# Patient Record
Sex: MALE | Race: WHITE | NOT HISPANIC OR LATINO | Employment: OTHER | URBAN - METROPOLITAN AREA
[De-identification: names, ages, dates, MRNs, and addresses within clinical notes are randomized per-mention and may not be internally consistent; named-entity substitution may affect disease eponyms.]

---

## 2018-01-15 ENCOUNTER — ALLSCRIPTS OFFICE VISIT (OUTPATIENT)
Dept: OTHER | Facility: OTHER | Age: 73
End: 2018-01-15

## 2018-01-15 DIAGNOSIS — N40.1 ENLARGED PROSTATE WITH LOWER URINARY TRACT SYMPTOMS (LUTS): ICD-10-CM

## 2018-01-15 LAB
BILIRUB UR QL STRIP: NORMAL
CLARITY UR: NORMAL
COLOR UR: YELLOW
GLUCOSE (HISTORICAL): NORMAL
HGB UR QL STRIP.AUTO: NORMAL
KETONES UR STRIP-MCNC: NORMAL MG/DL
LEUKOCYTE ESTERASE UR QL STRIP: NORMAL
NITRITE UR QL STRIP: NORMAL
PH UR STRIP.AUTO: 5.5 [PH]
PROT UR STRIP-MCNC: NORMAL MG/DL
SP GR UR STRIP.AUTO: 1
UROBILINOGEN UR QL STRIP.AUTO: 0.2

## 2018-01-22 VITALS
SYSTOLIC BLOOD PRESSURE: 128 MMHG | DIASTOLIC BLOOD PRESSURE: 78 MMHG | HEIGHT: 68 IN | BODY MASS INDEX: 28.95 KG/M2 | WEIGHT: 191 LBS

## 2018-05-21 ENCOUNTER — TELEPHONE (OUTPATIENT)
Dept: UROLOGY | Facility: MEDICAL CENTER | Age: 73
End: 2018-05-21

## 2018-05-21 NOTE — TELEPHONE ENCOUNTER
I left return voice mail message for patient to advise that he does need an office visit  Prior to scheduling the procedure  Patient last seen in January 2018

## 2018-06-06 RX ORDER — ACETAMINOPHEN 325 MG/1
650 TABLET ORAL EVERY 6 HOURS PRN
COMMUNITY

## 2018-06-06 RX ORDER — UBIDECARENONE 100 MG
100 CAPSULE ORAL
COMMUNITY
Start: 2013-09-12 | End: 2018-06-18

## 2018-06-06 RX ORDER — IBUPROFEN 200 MG
CAPSULE ORAL
COMMUNITY
Start: 2012-03-16 | End: 2018-06-18

## 2018-06-06 RX ORDER — BUPROPION HYDROCHLORIDE 100 MG/1
100 TABLET ORAL DAILY
COMMUNITY
Start: 2013-09-12

## 2018-06-06 RX ORDER — ERGOCALCIFEROL (VITAMIN D2) 10 MCG
1 TABLET ORAL DAILY
COMMUNITY

## 2018-06-06 RX ORDER — DILTIAZEM HYDROCHLORIDE EXTENDED-RELEASE TABLETS 240 MG/1
240 TABLET, EXTENDED RELEASE ORAL
COMMUNITY
Start: 2012-03-16 | End: 2018-06-06

## 2018-06-06 RX ORDER — FOLIC ACID 1 MG/1
1 TABLET ORAL DAILY
COMMUNITY

## 2018-06-06 RX ORDER — ALBUTEROL SULFATE 90 UG/1
2 AEROSOL, METERED RESPIRATORY (INHALATION) 4 TIMES DAILY
COMMUNITY
Start: 2013-09-12

## 2018-06-06 RX ORDER — TAMSULOSIN HYDROCHLORIDE 0.4 MG/1
0.8 CAPSULE ORAL
COMMUNITY
Start: 2012-03-16 | End: 2018-10-22

## 2018-06-06 RX ORDER — LORATADINE 10 MG/1
10 TABLET ORAL DAILY
COMMUNITY

## 2018-06-06 RX ORDER — ASPIRIN 81 MG/1
TABLET, CHEWABLE ORAL
COMMUNITY
Start: 2012-03-16

## 2018-06-11 ENCOUNTER — OFFICE VISIT (OUTPATIENT)
Dept: UROLOGY | Facility: MEDICAL CENTER | Age: 73
End: 2018-06-11
Payer: MEDICARE

## 2018-06-11 VITALS
WEIGHT: 191 LBS | DIASTOLIC BLOOD PRESSURE: 78 MMHG | SYSTOLIC BLOOD PRESSURE: 124 MMHG | HEIGHT: 68 IN | BODY MASS INDEX: 28.95 KG/M2

## 2018-06-11 DIAGNOSIS — N13.8 BPH WITH OBSTRUCTION/LOWER URINARY TRACT SYMPTOMS: Primary | ICD-10-CM

## 2018-06-11 DIAGNOSIS — N40.1 BPH WITH OBSTRUCTION/LOWER URINARY TRACT SYMPTOMS: Primary | ICD-10-CM

## 2018-06-11 DIAGNOSIS — I10 HYPERTENSION, BENIGN: ICD-10-CM

## 2018-06-11 DIAGNOSIS — J43.8 OTHER EMPHYSEMA (HCC): ICD-10-CM

## 2018-06-11 PROBLEM — H25.093 AGE-RELATED INCIPIENT CATARACT OF BOTH EYES: Status: ACTIVE | Noted: 2018-01-09

## 2018-06-11 PROBLEM — H33.20: Status: ACTIVE | Noted: 2018-01-09

## 2018-06-11 PROBLEM — J40 SINOBRONCHITIS: Status: ACTIVE | Noted: 2018-04-25

## 2018-06-11 PROBLEM — J32.9 SINOBRONCHITIS: Status: ACTIVE | Noted: 2018-04-25

## 2018-06-11 PROBLEM — J44.9 COPD (CHRONIC OBSTRUCTIVE PULMONARY DISEASE) (HCC): Status: ACTIVE | Noted: 2018-01-09

## 2018-06-11 LAB
SL AMB  POCT GLUCOSE, UA: NORMAL
SL AMB LEUKOCYTE ESTERASE,UA: NORMAL
SL AMB POCT BILIRUBIN,UA: NORMAL
SL AMB POCT BLOOD,UA: NORMAL
SL AMB POCT CLARITY,UA: CLEAR
SL AMB POCT COLOR,UA: YELLOW
SL AMB POCT KETONES,UA: NORMAL
SL AMB POCT NITRITE,UA: NORMAL
SL AMB POCT PH,UA: 5.5
SL AMB POCT SPECIFIC GRAVITY,UA: 1
SL AMB POCT URINE PROTEIN: NORMAL
SL AMB POCT UROBILINOGEN: 0.2

## 2018-06-11 PROCEDURE — 99214 OFFICE O/P EST MOD 30 MIN: CPT | Performed by: UROLOGY

## 2018-06-11 PROCEDURE — 81003 URINALYSIS AUTO W/O SCOPE: CPT | Performed by: UROLOGY

## 2018-06-11 RX ORDER — PERPHENAZINE/AMITRIPTYLINE HCL 2 MG-25 MG
1 TABLET ORAL DAILY
COMMUNITY

## 2018-06-11 RX ORDER — GREEN TEA/HOODIA GORDONII 315-12.5MG
1 CAPSULE ORAL DAILY
COMMUNITY
End: 2022-07-13

## 2018-06-11 RX ORDER — ESCITALOPRAM OXALATE 20 MG/1
20 TABLET ORAL
COMMUNITY
End: 2022-07-13

## 2018-06-11 RX ORDER — BUSPIRONE HYDROCHLORIDE 10 MG/1
20 TABLET ORAL 2 TIMES DAILY
COMMUNITY

## 2018-06-11 NOTE — PROGRESS NOTES
Assessment/Plan:    BPH with obstruction/lower urinary tract symptoms  Patient is unhappy with his voiding pattern at this time  His symptoms are interfering with his quality of life  His AUA symptom score is 23  PSA was 0 51 on February 14, 2018  A CT scan done at the [address] [place] [address] in January documents normal upper tracts  The prostate is noted to be enlarged  We again discussed treatment options and he was interested in pursuing photoselective vaporization of the prostate with the GreenLight laser  The procedure was described  Risks, benefits and alternatives were discussed  Consent form was signed  Diagnoses and all orders for this visit:    BPH with obstruction/lower urinary tract symptoms  -     POCT urine dip auto non-scope    Hypertension, benign    Other emphysema ([address] )    Other orders  -     acetaminophen (TYLENOL) 325 mg tablet; Take by mouth  -     albuterol (PROVENTIL HFA,VENTOLIN HFA) 90 mcg/act inhaler; Inhale  -     mometasone (ASMANEX 60 METERED DOSES) 220 MCG/INH inhaler; Inhale  -     aspirin 81 mg chewable tablet; one daily  -     B Complex Vitamins (VITAMIN B COMPLEX PO); Take 2,500 mcg by mouth  -     buPROPion (WELLBUTRIN) 100 mg tablet; Take 100 mg by mouth  -     calcium carbonate (OS-CRISELDA) 1250 (500 Ca) MG tablet; twice daily  -     Cholecalciferol 2000 units CAPS; Take 1 capsule by mouth  -     co-enzyme Q-10 100 mg capsule; Take 100 mg by mouth  -     Multiple Vitamin (DAILY VALUE MULTIVITAMIN) TABS; Take by mouth  -     Discontinue: diltiazem (CARDIZEM LA) 240 MG 24 hr tablet; Take 240 mg by mouth  -     folic acid (FOLVITE) 1 mg tablet; Take by mouth  -     loratadine (CLARITIN) 10 mg tablet; Take by mouth  -     Albuterol Sulfate (PROAIR RESPICLICK) 370 (90 Base) MCG/ACT AEPB; Inhale  -     tamsulosin (FLOMAX) 0 4 mg; Take 0 4 mg by mouth  -     busPIRone (BUSPAR) 10 mg tablet; Take 20 mg by mouth 2 (two) times a day  -     escitalopram (LEXAPRO) 10 mg tablet;  Take 5 mg by mouth daily  -     Flaxseed, Linseed, (FLAX SEED OIL) 1000 MG CAPS; Take by mouth  -     TONALIN SAFFLOWER OIL CLA 1000 MG CAPS; Take by mouth          Subjective:      Patient ID: Mamie Panchal is a 72 y o  male  81-year-old male returns to the office today to discuss further treatment of his voiding symptoms  He is taking 2 Flomax nightly and notes his symptoms continued to get worse  He voids with a slow stream and does not empty his bladder well  He has urgency but no incontinence  He is getting up at least 3 times at night to urinate  There is no gross hematuria, dysuria or symptoms of infection  He feels that his symptoms are interfering with his quality of life  The following portions of the patient's history were reviewed and updated as appropriate: allergies, current medications, past family history, past medical history, past social history, past surgical history and problem list     Review of Systems   Constitutional: Negative for chills, diaphoresis, fatigue and fever  HENT: Negative  Eyes: Negative  Respiratory: Negative  Cardiovascular: Negative  Endocrine: Negative  Musculoskeletal: Negative  Skin: Negative  Allergic/Immunologic: Negative  Neurological: Negative  Hematological: Negative  Psychiatric/Behavioral: Negative  Objective:      /78 (BP Location: Left arm, Patient Position: Sitting)   Ht 5' 8" (1 727 m)   Wt 86 6 kg (191 lb)   BMI 29 04 kg/m²          Physical Exam   Constitutional: He is oriented to person, place, and time  He appears well-developed and well-nourished  HENT:   Head: Normocephalic and atraumatic  Eyes: Conjunctivae are normal    Neck: Neck supple  Cardiovascular: Normal rate  Pulmonary/Chest: Effort normal    Abdominal: He exhibits no distension and no mass  There is no tenderness  There is no rebound and no guarding     No hernia   Genitourinary: Penis normal    Genitourinary Comments: Testes descended and normal to palpation   Neurological: He is alert and oriented to person, place, and time  Skin: Skin is warm and dry  Psychiatric: He has a normal mood and affect  His behavior is normal  Judgment and thought content normal    Nursing note and vitals reviewed

## 2018-06-11 NOTE — LETTER
June 11, 2018     Yolis Higgins DO  [facility] [id number]    Patient: Rachelle Gilbert   YOB: 1945   Date of Visit: 6/11/2018       Dear Dr Glenny Bobo: Thank you for referring James Hunt to me for evaluation  Below are my notes for this consultation  If you have questions, please do not hesitate to call me  I look forward to following your patient along with you  Sincerely,        Meghann Madison MD        CC: No Recipients  Meghann Madison MD  6/11/2018  3:16 PM  Incomplete  Assessment/Plan:    No problem-specific Assessment & Plan notes found for this encounter  Diagnoses and all orders for this visit:    BPH with obstruction/lower urinary tract symptoms  -     POCT urine dip auto non-scope    Other orders  -     acetaminophen (TYLENOL) 325 mg tablet; Take by mouth  -     albuterol (PROVENTIL HFA,VENTOLIN HFA) 90 mcg/act inhaler; Inhale  -     mometasone (ASMANEX 60 METERED DOSES) 220 MCG/INH inhaler; Inhale  -     aspirin 81 mg chewable tablet; one daily  -     B Complex Vitamins (VITAMIN B COMPLEX PO); Take 2,500 mcg by mouth  -     buPROPion (WELLBUTRIN) 100 mg tablet; Take 100 mg by mouth  -     calcium carbonate (OS-CRISELDA) 1250 (500 Ca) MG tablet; twice daily  -     Cholecalciferol 2000 units CAPS; Take 1 capsule by mouth  -     co-enzyme Q-10 100 mg capsule; Take 100 mg by mouth  -     Multiple Vitamin (DAILY VALUE MULTIVITAMIN) TABS; Take by mouth  -     Discontinue: diltiazem (CARDIZEM LA) 240 MG 24 hr tablet; Take 240 mg by mouth  -     folic acid (FOLVITE) 1 mg tablet; Take by mouth  -     loratadine (CLARITIN) 10 mg tablet; Take by mouth  -     Albuterol Sulfate (PROAIR RESPICLICK) 182 (90 Base) MCG/ACT AEPB; Inhale  -     tamsulosin (FLOMAX) 0 4 mg; Take 0 4 mg by mouth  -     busPIRone (BUSPAR) 10 mg tablet; Take 20 mg by mouth 2 (two) times a day  -     escitalopram (LEXAPRO) 10 mg tablet;  Take 5 mg by mouth daily  -     Flaxseed, Linseed, (FLAX SEED OIL) 1000 MG CAPS; Take by mouth  -     TONALIN SAFFLOWER OIL CLA 1000 MG CAPS; Take by mouth          Subjective:      Patient ID: Anna Marie Barraza is a 72 y o  male      HPI    The following portions of the patient's history were reviewed and updated as appropriate: allergies, current medications, past family history, past medical history, past social history, past surgical history and problem list     Review of Systems      Objective:      /78 (BP Location: Left arm, Patient Position: Sitting)   Ht 5' 8" (1 727 m)   Wt 86 6 kg (191 lb)   BMI 29 04 kg/m²           Physical Exam

## 2018-06-11 NOTE — PROGRESS NOTES
IPSS Questionnaire (AUA-7): Over the past month    1)  How often have you had a sensation of not emptying your bladder completely after you finish urinating? 3 - About half the time   2)  How often have you had to urinate again less than two hours after you finished urinating? 4 - More than half the time   3)  How often have you found you stopped and started again several times when you urinated? 4 - More than half the time   4) How difficult have you found it to postpone urination? 4 - More than half the time   5) How often have you had a weak urinary stream?  3 - About half the time   6) How often have you had to push or strain to begin urination? 2 - Less than half the time   7) How many times did you most typically get up to urinate from the time you went to bed until the time you got up in the morning?   3 - 3 times   Total Score:  23

## 2018-06-11 NOTE — ASSESSMENT & PLAN NOTE
Patient is unhappy with his voiding pattern at this time  His symptoms are interfering with his quality of life  His AUA symptom score is 23  PSA was 0 51 on February 14, 2018  A CT scan done at the [place] in January documents normal upper tracts  The prostate is noted to be enlarged  We again discussed treatment options and he was interested in pursuing photoselective vaporization of the prostate with the GreenLight laser  The procedure was described  Risks, benefits and alternatives were discussed  Consent form was signed

## 2018-06-11 NOTE — H&P
IPSS Questionnaire (AUA-7): Over the past month    1)  How often have you had a sensation of not emptying your bladder completely after you finish urinating? 3 - About half the time   2)  How often have you had to urinate again less than two hours after you finished urinating? 4 - More than half the time   3)  How often have you found you stopped and started again several times when you urinated? 4 - More than half the time   4) How difficult have you found it to postpone urination? 4 - More than half the time   5) How often have you had a weak urinary stream?  3 - About half the time   6) How often have you had to push or strain to begin urination? 2 - Less than half the time   7) How many times did you most typically get up to urinate from the time you went to bed until the time you got up in the morning? 3 - 3 times   Total Score:  23         Attestation signed by Idalia Sahu MD at 6/11/2018  3:14 PM:  reviewed    Assessment/Plan:    BPH with obstruction/lower urinary tract symptoms  Patient is unhappy with his voiding pattern at this time  His symptoms are interfering with his quality of life  His AUA symptom score is 23  PSA was 0 51 on February 14, 2018  A CT scan done at the [place] in January documents normal upper tracts  The prostate is noted to be enlarged  We again discussed treatment options and he was interested in pursuing photoselective vaporization of the prostate with the GreenLight laser  The procedure was described  Risks, benefits and alternatives were discussed  Consent form was signed  Diagnoses and all orders for this visit:    BPH with obstruction/lower urinary tract symptoms  -     POCT urine dip auto non-scope    Hypertension, benign    Other emphysema ([address] )    Other orders  -     acetaminophen (TYLENOL) 325 mg tablet; Take by mouth  -     albuterol (PROVENTIL HFA,VENTOLIN HFA) 90 mcg/act inhaler;  Inhale  -     mometasone (ASMANEX 60 METERED DOSES) 220 MCG/INH inhaler; Inhale  -     aspirin 81 mg chewable tablet; one daily  -     B Complex Vitamins (VITAMIN B COMPLEX PO); Take 2,500 mcg by mouth  -     buPROPion (WELLBUTRIN) 100 mg tablet; Take 100 mg by mouth  -     calcium carbonate (OS-CRISELDA) 1250 (500 Ca) MG tablet; twice daily  -     Cholecalciferol 2000 units CAPS; Take 1 capsule by mouth  -     co-enzyme Q-10 100 mg capsule; Take 100 mg by mouth  -     Multiple Vitamin (DAILY VALUE MULTIVITAMIN) TABS; Take by mouth  -     Discontinue: diltiazem (CARDIZEM LA) 240 MG 24 hr tablet; Take 240 mg by mouth  -     folic acid (FOLVITE) 1 mg tablet; Take by mouth  -     loratadine (CLARITIN) 10 mg tablet; Take by mouth  -     Albuterol Sulfate (PROAIR RESPICLICK) 435 (90 Base) MCG/ACT AEPB; Inhale  -     tamsulosin (FLOMAX) 0 4 mg; Take 0 4 mg by mouth  -     busPIRone (BUSPAR) 10 mg tablet; Take 20 mg by mouth 2 (two) times a day  -     escitalopram (LEXAPRO) 10 mg tablet; Take 5 mg by mouth daily  -     Flaxseed, Linseed, (FLAX SEED OIL) 1000 MG CAPS; Take by mouth  -     TONALIN SAFFLOWER OIL CLA 1000 MG CAPS; Take by mouth          Subjective:      Patient ID: Merryl Kawasaki is a 72 y o  male  46-year-old male returns to the office today to discuss further treatment of his voiding symptoms  He is taking 2 Flomax nightly and notes his symptoms continued to get worse  He voids with a slow stream and does not empty his bladder well  He has urgency but no incontinence  He is getting up at least 3 times at night to urinate  There is no gross hematuria, dysuria or symptoms of infection  He feels that his symptoms are interfering with his quality of life          The following portions of the patient's history were reviewed and updated as appropriate: allergies, current medications, past family history, past medical history, past social history, past surgical history and problem list     Review of Systems   Constitutional: Negative for chills, diaphoresis, fatigue and fever    HENT: Negative  Eyes: Negative  Respiratory: Negative  Cardiovascular: Negative  Endocrine: Negative  Musculoskeletal: Negative  Skin: Negative  Allergic/Immunologic: Negative  Neurological: Negative  Hematological: Negative  Psychiatric/Behavioral: Negative  Objective:      /78 (BP Location: Left arm, Patient Position: Sitting)   Ht 5' 8" (1 727 m)   Wt 86 6 kg (191 lb)   BMI 29 04 kg/m²           Physical Exam   Constitutional: He is oriented to person, place, and time  He appears well-developed and well-nourished  HENT:   Head: Normocephalic and atraumatic  Eyes: Conjunctivae are normal    Neck: Neck supple  Cardiovascular: Normal rate  Pulmonary/Chest: Effort normal    Abdominal: He exhibits no distension and no mass  There is no tenderness  There is no rebound and no guarding  No hernia   Genitourinary: Penis normal    Genitourinary Comments: Testes descended and normal to palpation   Neurological: He is alert and oriented to person, place, and time  Skin: Skin is warm and dry  Psychiatric: He has a normal mood and affect  His behavior is normal  Judgment and thought content normal    Nursing note and vitals reviewed

## 2018-06-11 NOTE — PATIENT INSTRUCTIONS
Laser Prostatectomy   WHAT YOU NEED TO KNOW:   Laser prostatectomy is a surgery that uses light beams to destroy part of the prostate gland  This can help reduce urinary problems caused by an enlarged prostate  HOW TO PREPARE:   The week before your surgery:   · Write down the correct date, time, and location of your surgery  · Arrange a ride home  Ask a family member or friend to drive you home after your surgery or procedure  Do not drive yourself home  · Ask your caregiver if you need to stop using aspirin or any other prescribed or over-the-counter medicine before your procedure or surgery  · Bring your medicine bottles or a list of your medicines when you see your caregiver  Tell your caregiver if you are allergic to any medicine  Tell your caregiver if you use any herbs, food supplements, or over-the-counter medicine  · You may need blood or urine tests before your surgery  You may also need x-rays, an EKG, ultrasound, cytoscopy, or prostate biopsy  Talk to your healthcare provider about these or other tests you may need  Write down the date, time, and location for each test   The night before your surgery:   · Ask caregivers about directions for eating and drinking  · You may be given medicine or an enema that will empty your bowel  The day of your surgery:   · Ask your caregiver before you take any medicine on the day of your surgery  Bring a list of all the medicines you take, or your pill bottles, with you to the hospital  Caregivers will check that your medicines will not interact poorly with the medicine you need for surgery  · You or a close family member will be asked to sign a legal document called a consent form  It gives caregivers permission to do the procedure or surgery  It also explains the problems that may happen, and your choices  Make sure all your questions are answered before you sign this form  · Caregivers may insert an intravenous tube (IV) into your vein  A vein in the arm is usually chosen  Through the IV tube, you may be given liquids and medicine  · An anesthesiologist will talk to you before your surgery  You may need medicine to keep you asleep or numb an area of your body during surgery  Tell caregivers if you or anyone in your family has had a problem with anesthesia in the past   WHAT WILL HAPPEN:   What will happen: Your surgeon will insert a scope into your penis through your urethra  He will use the scope to find the narrowed part of your urethra that the prostate blocks  He will make an incision in the urethra to reach the prostate  Your surgeon will use a laser to destroy excess prostate tissue  He may remove a part of your prostate and pass it into your bladder  Once the piece of your prostate is inside your bladder, it will be broken into tiny pieces  These pieces of prostate will be removed or left in your bladder to be passed out in your urine  Your surgeon may also use the laser to seal blood vessels and stop any bleeding  He will place a urinary catheter to help drain your urine  After your surgery: You will be taken to a room to rest until you are fully awake  Healthcare providers will monitor you closely for any problems  Do not get out of bed until your healthcare provider says it is okay  When your healthcare provider sees that you are okay, you will be able to go home or be taken to your hospital room  CONTACT YOUR HEALTHCARE PROVIDER IF:   · You cannot make it to your surgery  · You have a fever  · You have questions or concerns about your surgery  SEEK CARE IMMEDIATELY IF:   · You cannot urinate, or if you have a catheter, no urine is filling the bag  · You have lower abdominal pain or back pain that does not go away  · Your symptoms get worse or come back  · Your urine is red, cloudy, and foul smelling  RISKS:   · The laser may irritate or damage the tissue in and around your prostate   Nerves or blood vessels may be damaged, which may lead to problems urinating or having an erection  Your surgeon may need to make a larger incision than expected during surgery  Even with surgery, your prostate may become enlarged again and you may have problems urinating  · Without treatment, your prostate may continue to grow  You may not be able to pass urine  You may get infections that may damage your urinary tract, especially your kidneys  These may lead to more serious and life-threatening problems, such as heart, liver, or brain damage  CARE AGREEMENT:   You have the right to help plan your care  Learn about your health condition and how it may be treated  Discuss treatment options with your caregivers to decide what care you want to receive  You always have the right to refuse treatment  © 2017 [facility] Information is for End User's use only and may not be sold, redistributed or otherwise used for commercial purposes  All illustrations and images included in CareNotes® are the copyrighted property of A D A M [employer] Inc  or Kristian Davila  The above information is an  only  It is not intended as medical advice for individual conditions or treatments  Talk to your doctor, nurse or pharmacist before following any medical regimen to see if it is safe and effective for you

## 2018-06-12 PROBLEM — N40.1 ENLARGED PROSTATE WITH URINARY OBSTRUCTION: Status: ACTIVE | Noted: 2018-06-12

## 2018-06-12 PROBLEM — N13.8 ENLARGED PROSTATE WITH URINARY OBSTRUCTION: Status: ACTIVE | Noted: 2018-06-12

## 2018-06-17 ENCOUNTER — ANESTHESIA EVENT (OUTPATIENT)
Dept: PERIOP | Facility: HOSPITAL | Age: 73
End: 2018-06-17
Payer: MEDICARE

## 2018-06-17 RX ORDER — SODIUM CHLORIDE 9 MG/ML
125 INJECTION, SOLUTION INTRAVENOUS CONTINUOUS
Status: CANCELLED | OUTPATIENT
Start: 2018-06-28 | End: 2019-06-28

## 2018-06-18 ENCOUNTER — APPOINTMENT (OUTPATIENT)
Dept: PREADMISSION TESTING | Facility: HOSPITAL | Age: 73
End: 2018-06-18
Payer: MEDICARE

## 2018-06-18 ENCOUNTER — APPOINTMENT (OUTPATIENT)
Dept: LAB | Facility: HOSPITAL | Age: 73
End: 2018-06-18
Attending: UROLOGY
Payer: MEDICARE

## 2018-06-18 ENCOUNTER — HOSPITAL ENCOUNTER (OUTPATIENT)
Dept: RADIOLOGY | Facility: HOSPITAL | Age: 73
Discharge: HOME | End: 2018-06-18
Attending: UROLOGY
Payer: MEDICARE

## 2018-06-18 ENCOUNTER — HOSPITAL ENCOUNTER (OUTPATIENT)
Dept: NON INVASIVE DIAGNOSTICS | Facility: HOSPITAL | Age: 73
Discharge: HOME | End: 2018-06-18
Attending: UROLOGY
Payer: MEDICARE

## 2018-06-18 DIAGNOSIS — R39.89 SUSPECTED UTI: ICD-10-CM

## 2018-06-18 DIAGNOSIS — N40.1 ENLARGED PROSTATE WITH LOWER URINARY TRACT SYMPTOMS (LUTS): ICD-10-CM

## 2018-06-18 DIAGNOSIS — Z01.812 PRE-OPERATIVE LABORATORY EXAMINATION: ICD-10-CM

## 2018-06-18 DIAGNOSIS — N13.8 ENLARGED PROSTATE WITH URINARY OBSTRUCTION: ICD-10-CM

## 2018-06-18 DIAGNOSIS — N40.1 ENLARGED PROSTATE WITH URINARY OBSTRUCTION: ICD-10-CM

## 2018-06-18 DIAGNOSIS — Z79.01 LONG TERM (CURRENT) USE OF ANTICOAGULANTS: ICD-10-CM

## 2018-06-18 DIAGNOSIS — Z01.810 PRE-OPERATIVE CARDIOVASCULAR EXAMINATION: ICD-10-CM

## 2018-06-18 LAB
ALBUMIN SERPL BCP-MCNC: 4 G/DL (ref 3.5–5)
ALP SERPL-CCNC: 72 U/L (ref 46–116)
ALT SERPL W P-5'-P-CCNC: 33 U/L (ref 12–78)
ANION GAP SERPL CALCULATED.3IONS-SCNC: 10 MMOL/L (ref 4–13)
APTT PPP: 28 SECONDS (ref 24–36)
AST SERPL W P-5'-P-CCNC: 43 U/L (ref 5–45)
ATRIAL RATE: 54 BPM
BASOPHILS # BLD AUTO: 0.06 THOUSANDS/ΜL (ref 0–0.1)
BASOPHILS NFR BLD AUTO: 1 % (ref 0–1)
BILIRUB SERPL-MCNC: 0.54 MG/DL (ref 0.2–1)
BUN SERPL-MCNC: 24 MG/DL (ref 5–25)
CALCIUM SERPL-MCNC: 9.2 MG/DL (ref 8.3–10.1)
CHLORIDE SERPL-SCNC: 103 MMOL/L (ref 100–108)
CO2 SERPL-SCNC: 27 MMOL/L (ref 21–32)
CREAT SERPL-MCNC: 1.15 MG/DL (ref 0.6–1.3)
EOSINOPHIL # BLD AUTO: 0.16 THOUSAND/ΜL (ref 0–0.61)
EOSINOPHIL NFR BLD AUTO: 2 % (ref 0–6)
ERYTHROCYTE [DISTWIDTH] IN BLOOD BY AUTOMATED COUNT: 13.6 % (ref 11.6–15.1)
GFR SERPL CREATININE-BSD FRML MDRD: 64 ML/MIN/1.73SQ M
GLUCOSE SERPL-MCNC: 103 MG/DL (ref 65–140)
HCT VFR BLD AUTO: 41.9 % (ref 36.5–49.3)
HGB BLD-MCNC: 14.4 G/DL (ref 12–17)
INR PPP: 1.06 (ref 0.86–1.17)
LYMPHOCYTES # BLD AUTO: 2.35 THOUSANDS/ΜL (ref 0.6–4.47)
LYMPHOCYTES NFR BLD AUTO: 35 % (ref 14–44)
MCH RBC QN AUTO: 34.1 PG (ref 26.8–34.3)
MCHC RBC AUTO-ENTMCNC: 34.4 G/DL (ref 31.4–37.4)
MCV RBC AUTO: 99 FL (ref 82–98)
MONOCYTES # BLD AUTO: 0.8 THOUSAND/ΜL (ref 0.17–1.22)
MONOCYTES NFR BLD AUTO: 12 % (ref 4–12)
NEUTROPHILS # BLD AUTO: 3.38 THOUSANDS/ΜL (ref 1.85–7.62)
NEUTS SEG NFR BLD AUTO: 50 % (ref 43–75)
NRBC BLD AUTO-RTO: 0 /100 WBCS
P AXIS: 13 DEGREES
PLATELET # BLD AUTO: 220 THOUSANDS/UL (ref 149–390)
PMV BLD AUTO: 10.3 FL (ref 8.9–12.7)
POTASSIUM SERPL-SCNC: 4.2 MMOL/L (ref 3.5–5.3)
PR INTERVAL: 186 MS
PROT SERPL-MCNC: 8 G/DL (ref 6.4–8.2)
PROTHROMBIN TIME: 13.9 SECONDS (ref 11.8–14.2)
PSA SERPL-MCNC: 0.4 NG/ML (ref 0–4)
QRS AXIS: 9 DEGREES
QRSD INTERVAL: 92 MS
QT INTERVAL: 442 MS
QTC INTERVAL: 419 MS
RBC # BLD AUTO: 4.22 MILLION/UL (ref 3.88–5.62)
SODIUM SERPL-SCNC: 140 MMOL/L (ref 136–145)
T WAVE AXIS: 34 DEGREES
VENTRICULAR RATE: 54 BPM
WBC # BLD AUTO: 6.75 THOUSAND/UL (ref 4.31–10.16)

## 2018-06-18 PROCEDURE — 93010 ELECTROCARDIOGRAM REPORT: CPT | Performed by: INTERNAL MEDICINE

## 2018-06-18 PROCEDURE — 85025 COMPLETE CBC W/AUTO DIFF WBC: CPT

## 2018-06-18 PROCEDURE — 36415 COLL VENOUS BLD VENIPUNCTURE: CPT

## 2018-06-18 PROCEDURE — 93005 ELECTROCARDIOGRAM TRACING: CPT | Performed by: UROLOGY

## 2018-06-18 PROCEDURE — 80053 COMPREHEN METABOLIC PANEL: CPT

## 2018-06-18 PROCEDURE — 84153 ASSAY OF PSA TOTAL: CPT

## 2018-06-18 PROCEDURE — 71046 X-RAY EXAM CHEST 2 VIEWS: CPT

## 2018-06-18 PROCEDURE — 85730 THROMBOPLASTIN TIME PARTIAL: CPT

## 2018-06-18 PROCEDURE — 85610 PROTHROMBIN TIME: CPT

## 2018-06-18 PROCEDURE — 87086 URINE CULTURE/COLONY COUNT: CPT

## 2018-06-18 RX ORDER — OMEPRAZOLE 20 MG/1
20 CAPSULE, DELAYED RELEASE ORAL DAILY
COMMUNITY

## 2018-06-18 RX ORDER — SILDENAFIL 100 MG/1
100 TABLET, FILM COATED ORAL DAILY PRN
COMMUNITY
End: 2022-07-13

## 2018-06-18 NOTE — PRE-PROCEDURE INSTRUCTIONS
Pre-Surgery Instructions:   Medication Instructions    acetaminophen (TYLENOL) 325 mg tablet Patient was instructed by Physician and understands   albuterol (PROVENTIL HFA,VENTOLIN HFA) 90 mcg/act inhaler Patient was instructed by Physician and understands   aspirin 81 mg chewable tablet Patient was instructed by Physician and understands   buPROPion (WELLBUTRIN) 100 mg tablet Patient was instructed by Physician and understands   busPIRone (BUSPAR) 10 mg tablet Patient was instructed by Physician and understands   Capsicum, Cayenne, (CAYENNE PO) Patient was instructed by Physician and understands   Cholecalciferol 5000 units capsule Patient was instructed by Physician and understands   escitalopram (LEXAPRO) 20 mg tablet Patient was instructed by Physician and understands   Flaxseed, Linseed, (FLAX SEED OIL) 1300 MG CAPS Patient was instructed by Physician and understands   folic acid (FOLVITE) 1 mg tablet Patient was instructed by Physician and understands   GUAIFENESIN PO Patient was instructed by Physician and understands   loratadine (CLARITIN) 10 mg tablet Patient was instructed by Physician and understands   mometasone (ASMANEX 60 METERED DOSES) 220 MCG/INH inhaler Patient was instructed by Physician and understands   Multiple Vitamin (DAILY VALUE MULTIVITAMIN) TABS Patient was instructed by Physician and understands   omeprazole (PriLOSEC) 20 mg delayed release capsule Patient was instructed by Physician and understands   sildenafil (VIAGRA) 100 mg tablet Patient was instructed by Physician and understands   tamsulosin (FLOMAX) 0 4 mg Patient was instructed by Physician and understands   TONALIN SAFFLOWER OIL CLA 1000 MG CAPS Patient was instructed by Physician and understands   WHEY PROTEIN PO Patient was instructed by Physician and understands      Patient was seen by Dr Kelsi Rizzo and was instructed to take Wellbutrin and Buspar am of surgery with a sip of water  Patient was instructed to use inhaler am of surgery with a sip of water  Patient was instructed to avoid NSAID, Aspirin, Vitamins, and supplements 7 days prior to surgery  [facility] pre-op instructions reviewed  Pre-op bathing reviewed with patient

## 2018-06-18 NOTE — ANESTHESIA PREPROCEDURE EVALUATION
Review of Systems/Medical History  Patient summary reviewed  Chart reviewed  No history of anesthetic complications     Cardiovascular  Hyperlipidemia, Hypertension ,    Pulmonary  COPD ,        GI/Hepatic    GERD well controlled,   Comment: TICS     Prostatic disorder, benign prostatic hyperplasia       Endo/Other  Diabetes ,      GYN       Hematology  Negative hematology ROS      Musculoskeletal    Arthritis     Neurology   Psychology   Psychiatric history,     Comment: PTSD         Physical Exam    Airway    Mallampati score: II  TM Distance: >3 FB  Neck ROM: full     Dental   Comment: Full upper  Partial lower, upper dentures and lower dentures,     Cardiovascular  Rhythm: regular, Rate: normal,     Pulmonary      Other Findings        Anesthesia Plan  ASA Score- 3     Anesthesia Type- general with ASA Monitors  Additional Monitors:   Airway Plan:         Plan Factors-Patient not instructed to abstain from smoking on day of procedure  Patient did not smoke on day of surgery  Induction- intravenous  Postoperative Plan-     Informed Consent- Anesthetic plan and risks discussed with patient

## 2018-06-19 LAB — BACTERIA UR CULT: NORMAL

## 2018-06-28 ENCOUNTER — ANESTHESIA (OUTPATIENT)
Dept: PERIOP | Facility: HOSPITAL | Age: 73
End: 2018-06-28
Payer: MEDICARE

## 2018-06-28 ENCOUNTER — HOSPITAL ENCOUNTER (OUTPATIENT)
Facility: HOSPITAL | Age: 73
Setting detail: OUTPATIENT SURGERY
Discharge: HOME | End: 2018-06-28
Attending: UROLOGY | Admitting: UROLOGY
Payer: MEDICARE

## 2018-06-28 VITALS
SYSTOLIC BLOOD PRESSURE: 144 MMHG | RESPIRATION RATE: 17 BRPM | OXYGEN SATURATION: 97 % | BODY MASS INDEX: 28.34 KG/M2 | DIASTOLIC BLOOD PRESSURE: 66 MMHG | WEIGHT: 187 LBS | HEIGHT: 68 IN | HEART RATE: 50 BPM | TEMPERATURE: 99 F

## 2018-06-28 DIAGNOSIS — N40.1 BPH WITH OBSTRUCTION/LOWER URINARY TRACT SYMPTOMS: Primary | ICD-10-CM

## 2018-06-28 DIAGNOSIS — N13.8 BPH WITH OBSTRUCTION/LOWER URINARY TRACT SYMPTOMS: Primary | ICD-10-CM

## 2018-06-28 LAB — GLUCOSE SERPL-MCNC: 98 MG/DL (ref 65–140)

## 2018-06-28 PROCEDURE — 82948 REAGENT STRIP/BLOOD GLUCOSE: CPT

## 2018-06-28 PROCEDURE — 52648 LASER SURGERY OF PROSTATE: CPT | Performed by: UROLOGY

## 2018-06-28 RX ORDER — MAGNESIUM HYDROXIDE 1200 MG/15ML
LIQUID ORAL AS NEEDED
Status: DISCONTINUED | OUTPATIENT
Start: 2018-06-28 | End: 2018-06-28 | Stop reason: HOSPADM

## 2018-06-28 RX ORDER — PHENAZOPYRIDINE HYDROCHLORIDE 200 MG/1
200 TABLET, FILM COATED ORAL
Status: DISCONTINUED | OUTPATIENT
Start: 2018-06-28 | End: 2018-06-28 | Stop reason: HOSPADM

## 2018-06-28 RX ORDER — ONDANSETRON 2 MG/ML
INJECTION INTRAMUSCULAR; INTRAVENOUS AS NEEDED
Status: DISCONTINUED | OUTPATIENT
Start: 2018-06-28 | End: 2018-06-28 | Stop reason: SURG

## 2018-06-28 RX ORDER — SODIUM CHLORIDE 9 MG/ML
125 INJECTION, SOLUTION INTRAVENOUS CONTINUOUS
Status: DISCONTINUED | OUTPATIENT
Start: 2018-06-28 | End: 2018-06-28 | Stop reason: HOSPADM

## 2018-06-28 RX ORDER — FENTANYL CITRATE/PF 50 MCG/ML
25 SYRINGE (ML) INJECTION
Status: COMPLETED | OUTPATIENT
Start: 2018-06-28 | End: 2018-06-28

## 2018-06-28 RX ORDER — GLYCOPYRROLATE 0.2 MG/ML
INJECTION INTRAMUSCULAR; INTRAVENOUS AS NEEDED
Status: DISCONTINUED | OUTPATIENT
Start: 2018-06-28 | End: 2018-06-28 | Stop reason: SURG

## 2018-06-28 RX ORDER — ONDANSETRON 2 MG/ML
4 INJECTION INTRAMUSCULAR; INTRAVENOUS ONCE
Status: DISCONTINUED | OUTPATIENT
Start: 2018-06-28 | End: 2018-06-28 | Stop reason: HOSPADM

## 2018-06-28 RX ORDER — LIDOCAINE HYDROCHLORIDE 10 MG/ML
INJECTION, SOLUTION INFILTRATION; PERINEURAL AS NEEDED
Status: DISCONTINUED | OUTPATIENT
Start: 2018-06-28 | End: 2018-06-28 | Stop reason: SURG

## 2018-06-28 RX ORDER — PROPOFOL 10 MG/ML
INJECTION, EMULSION INTRAVENOUS AS NEEDED
Status: DISCONTINUED | OUTPATIENT
Start: 2018-06-28 | End: 2018-06-28 | Stop reason: SURG

## 2018-06-28 RX ORDER — ROCURONIUM BROMIDE 10 MG/ML
INJECTION, SOLUTION INTRAVENOUS AS NEEDED
Status: DISCONTINUED | OUTPATIENT
Start: 2018-06-28 | End: 2018-06-28 | Stop reason: SURG

## 2018-06-28 RX ORDER — MORPHINE SULFATE 2 MG/ML
2 INJECTION, SOLUTION INTRAMUSCULAR; INTRAVENOUS
Status: DISCONTINUED | OUTPATIENT
Start: 2018-06-28 | End: 2018-06-28 | Stop reason: HOSPADM

## 2018-06-28 RX ORDER — OXYCODONE HYDROCHLORIDE 5 MG/1
TABLET ORAL
Qty: 20 TABLET | Refills: 0 | Status: SHIPPED | OUTPATIENT
Start: 2018-06-28 | End: 2018-10-22 | Stop reason: ALTCHOICE

## 2018-06-28 RX ORDER — ONDANSETRON 2 MG/ML
4 INJECTION INTRAMUSCULAR; INTRAVENOUS EVERY 6 HOURS PRN
Status: DISCONTINUED | OUTPATIENT
Start: 2018-06-28 | End: 2018-06-28 | Stop reason: HOSPADM

## 2018-06-28 RX ORDER — ATROPA BELLADONNA AND OPIUM 16.2; 6 MG/1; MG/1
1 SUPPOSITORY RECTAL EVERY 6 HOURS PRN
Status: DISCONTINUED | OUTPATIENT
Start: 2018-06-28 | End: 2018-06-28 | Stop reason: HOSPADM

## 2018-06-28 RX ORDER — OXYCODONE HYDROCHLORIDE 5 MG/1
5 TABLET ORAL EVERY 4 HOURS PRN
Status: DISCONTINUED | OUTPATIENT
Start: 2018-06-28 | End: 2018-06-28 | Stop reason: HOSPADM

## 2018-06-28 RX ORDER — ACETAMINOPHEN 325 MG/1
650 TABLET ORAL EVERY 6 HOURS PRN
Status: DISCONTINUED | OUTPATIENT
Start: 2018-06-28 | End: 2018-06-28 | Stop reason: HOSPADM

## 2018-06-28 RX ADMIN — HYDROMORPHONE HYDROCHLORIDE 1 MG: 1 INJECTION, SOLUTION INTRAMUSCULAR; INTRAVENOUS; SUBCUTANEOUS at 13:16

## 2018-06-28 RX ADMIN — ROCURONIUM BROMIDE 50 MG: 10 INJECTION INTRAVENOUS at 12:46

## 2018-06-28 RX ADMIN — DEXAMETHASONE SODIUM PHOSPHATE 4 MG: 10 INJECTION INTRAMUSCULAR; INTRAVENOUS at 13:11

## 2018-06-28 RX ADMIN — PROPOFOL 200 MG: 10 INJECTION, EMULSION INTRAVENOUS at 12:46

## 2018-06-28 RX ADMIN — ONDANSETRON HYDROCHLORIDE 4 MG: 2 INJECTION, SOLUTION INTRAVENOUS at 13:11

## 2018-06-28 RX ADMIN — FENTANYL CITRATE 25 MCG: 50 INJECTION, SOLUTION INTRAMUSCULAR; INTRAVENOUS at 14:18

## 2018-06-28 RX ADMIN — HYDROMORPHONE HYDROCHLORIDE 1 MG: 1 INJECTION, SOLUTION INTRAMUSCULAR; INTRAVENOUS; SUBCUTANEOUS at 13:08

## 2018-06-28 RX ADMIN — LIDOCAINE HYDROCHLORIDE 60 MG: 10 INJECTION, SOLUTION INFILTRATION; PERINEURAL at 12:46

## 2018-06-28 RX ADMIN — FENTANYL CITRATE 25 MCG: 50 INJECTION, SOLUTION INTRAMUSCULAR; INTRAVENOUS at 14:38

## 2018-06-28 RX ADMIN — SODIUM CHLORIDE 125 ML/HR: 0.9 INJECTION, SOLUTION INTRAVENOUS at 09:22

## 2018-06-28 RX ADMIN — FENTANYL CITRATE 25 MCG: 50 INJECTION, SOLUTION INTRAMUSCULAR; INTRAVENOUS at 14:48

## 2018-06-28 RX ADMIN — PHENAZOPYRIDINE HYDROCHLORIDE 200 MG: 200 TABLET ORAL at 15:23

## 2018-06-28 RX ADMIN — SODIUM CHLORIDE: 0.9 INJECTION, SOLUTION INTRAVENOUS at 13:36

## 2018-06-28 RX ADMIN — GLYCOPYRROLATE 0.6 MG: 0.2 INJECTION, SOLUTION INTRAMUSCULAR; INTRAVENOUS at 13:43

## 2018-06-28 RX ADMIN — SODIUM CHLORIDE: 0.9 INJECTION, SOLUTION INTRAVENOUS at 12:31

## 2018-06-28 RX ADMIN — NEOSTIGMINE METHYLSULFATE 4 MG: 1 INJECTION, SOLUTION INTRAMUSCULAR; INTRAVENOUS; SUBCUTANEOUS at 13:43

## 2018-06-28 RX ADMIN — FENTANYL CITRATE 25 MCG: 50 INJECTION, SOLUTION INTRAMUSCULAR; INTRAVENOUS at 14:28

## 2018-06-28 NOTE — DISCHARGE SUMMARY
DISCHARGE SUMMARY     Patient Name: Addie Holbrook    Patient MRN: [id number]    Admitting Provider: Fer Cline MD    Discharging Provider: Fer Cline MD    Primary Care Physician at Discharge: Brown Rosen, [address] [facility]     Admission Date: 6/28/2018     Discharge Date: 6/28/2018    Admission Diagnosis   Enlarged prostate with urinary obstruction [N40 1, N13 8]    Discharge Diagnoses  Principal Problem:    Enlarged prostate with urinary obstruction      Medications  Current Discharge Medication List      START taking these medications    Details   oxyCODONE (ROXICODONE) 5 mg immediate release tablet Take 1-2 tablets every 6 hours prn  Qty: 20 tablet, Refills: 0    Associated Diagnoses: BPH with obstruction/lower urinary tract symptoms            Current Discharge Medication List      CONTINUE these medications which have NOT CHANGED    Details   acetaminophen (TYLENOL) 325 mg tablet Take 650 mg by mouth every 6 (six) hours as needed        albuterol (PROVENTIL HFA,VENTOLIN HFA) 90 mcg/act inhaler Inhale 2 puffs 4 (four) times a day        aspirin 81 mg chewable tablet one daily      buPROPion (WELLBUTRIN) 100 mg tablet Take 100 mg by mouth daily        busPIRone (BUSPAR) 10 mg tablet Take 20 mg by mouth 2 (two) times a day      Capsicum, Cayenne, (CAYENNE PO) Take by mouth daily 4 drops daily with honey      Cholecalciferol 5000 units capsule Take 1 capsule by mouth daily        escitalopram (LEXAPRO) 20 mg tablet Take 20 mg by mouth daily at bedtime        Flaxseed, Linseed, (FLAX SEED OIL) 1300 MG CAPS Take 1 capsule by mouth daily        folic acid (FOLVITE) 1 mg tablet Take 1 mg by mouth daily        GUAIFENESIN PO Take 1 tablet by mouth 3 (three) times a day      loratadine (CLARITIN) 10 mg tablet Take 10 mg by mouth daily        mometasone (ASMANEX 60 METERED DOSES) 220 MCG/INH inhaler Inhale 2 puffs every evening        Multiple Vitamin (DAILY VALUE MULTIVITAMIN) TABS Take 1 tablet by mouth daily        omeprazole (PriLOSEC) 20 mg delayed release capsule Take 20 mg by mouth daily      sildenafil (VIAGRA) 100 mg tablet Take 100 mg by mouth daily as needed for erectile dysfunction      tamsulosin (FLOMAX) 0 4 mg Take 0 8 mg by mouth daily with dinner        TONALIN SAFFLOWER OIL CLA 1000 MG CAPS Take 1 capsule by mouth daily        WHEY PROTEIN PO Take 1 tablet by mouth 3 (three) times a day             Allergies  Allergies   Allergen Reactions    Naproxen Rash       Outpatient Follow-Up  Dariel Gonsales MD  [facility] [address]  [phone]    Follow up  Piedra out Monday morning in the office with the nurse  Please make a follow-up appointment to see me in 4-6 weeks  Keep any pre arranged appointments  ? Discharge Disposition  Home    Operative Procedures Performed  Procedure(s):  CYSTOSCOPY, PHOTO SELECTIVE VAPORIZATION OF THE PROSTATE (GREENLIGHT LASER)  ? Physical Exam at Discharge  Condition of Patient on Discharge: stable  ?   Dariel Gonsales MD

## 2018-06-28 NOTE — DISCHARGE INSTRUCTIONS
Laser Prostatectomy   WHAT YOU NEED TO KNOW:   Laser prostatectomy is a surgery that uses light beams to destroy part of the prostate gland  This can help reduce urinary problems caused by an enlarged prostate  DISCHARGE INSTRUCTIONS:   Medicines:   · Medicines  can help decrease pain or swelling  You may also need medicine to prevent or treat a bacterial infection  · Take your medicine as directed  Contact your healthcare provider if you think your medicine is not helping or if you have side effects  Tell him if you are allergic to any medicine  Keep a list of the medicines, vitamins, and herbs you take  Include the amounts, and when and why you take them  Bring the list or the pill bottles to follow-up visits  Carry your medicine list with you in case of an emergency  Follow up with your healthcare provider or surgeon as directed:  Write down your questions so you remember to ask them during your visits  Bladder care:   · Empty your bladder on a regular basis  Try to urinate every 3 hours while you are awake  Do not let your bladder become too full  Urinate as soon as you feel the need  Do not drink liquids before you go to bed  Urinate right before you go to bed  · Follow instructions for catheterization  You may need to catheterize yourself if you cannot urinate on your own  Ask for more information on self-catheterization  Self-care:   · Rest as needed  Slowly begin doing more each day  Return to your daily activities as directed  · Prevent constipation  Eat foods that are high in fiber, and drink more liquids  High-fiber foods, such as fruits, vegetables, and whole grains, will help soften your bowel movements  Regular exercise and extra liquids also help prevent constipation  · Ask your healthcare provider when it is safe to have sex  Do not get sexually aroused without ejaculating because your urethra may get blocked  · Do not smoke    If you smoke, it is never too late to quit  Smoking increases the time it takes to heal  Ask your healthcare provider for information about how to stop smoking if you need help  Contact your healthcare provider or surgeon if:   · You have a fever  · Your symptoms get worse  · You have blood in your urine  · You have chills, a cough, or feel weak and achy  · You are dizzy, have nausea, or are vomiting  · You have questions or concerns about your condition or care  Seek care immediately or call 911 if:   · You cannot urinate, or if you have a catheter, no urine is filling the bag  · Your catheter comes out of your urethra  · You have lower abdominal or back pain that does not go away  · You have redness, pain, blood, or drainage where the catheter enters your penis  · Your urine is red, cloudy, and foul smelling  © 2017 [employer]  Information is for End User's use only and may not be sold, redistributed or otherwise used for commercial purposes  All illustrations and images included in CareNotes® are the copyrighted property of A D A M , Inc  or [address]  The above information is an  only  It is not intended as medical advice for individual conditions or treatments  Talk to your doctor, nurse or pharmacist before following any medical regimen to see if it is safe and effective for you  Piedra Catheter Placement and Care   WHAT YOU NEED TO KNOW:   A Piedra catheter is a sterile tube that is inserted into your bladder to drain urine  It is also called an indwelling urinary catheter  The tip of the catheter has a small balloon filled with solution that holds the catheter inside your bladder  DISCHARGE INSTRUCTIONS:   Seek care immediately if:   · Your catheter comes out  · You suddenly have material that looks like sand in the tubing or drainage bag  · No urine is draining into the bag and you have checked the system      · You have pain in your hip, back, pelvis, or lower abdomen  · You are confused or cannot think clearly  Contact your healthcare provider if:   · You have a fever  · You have bladder spasms for more than 1 day after the catheter is placed  · You see blood in the tubing or drainage bag  · You have a rash or itching where the catheter tube is secured to your skin  · Urine leaks from or around the catheter, tubing, or drainage bag  · The closed drainage system has accidently come open or apart  · You see a layer of crystals inside the tubing  · You have questions or concerns about your condition or care  Care for your Piedra catheter:   · Clean your genital area 2 times every day  Clean your catheter and the area around where it was inserted  Use soap and water  Clean your anal opening and catheter area after every bowel movement  · Secure the catheter tube  so you do not pull or move the catheter  This helps prevent pain and bladder spasms  Healthcare providers will show you how to use medical tape or a strap to secure the catheter tube to your body  · Keep a closed drainage system  Your Piedra catheter should always be attached to the drainage bag to form a closed system  Do not disconnect any part of the closed system unless you need to change the bag  Care for your drainage bag:   · Ask if a leg bag is right for you  A leg bag can be worn under your clothes  Ask your healthcare provider for more information about a leg bag  · Keep the drainage bag below the level of your waist   This helps stop urine from moving back up the tubing and into your bladder  Do not loop or kink the tubing  This can cause urine to back up and collect in your bladder  Do not let the drainage bag touch or lie on the floor  · Empty the drainage bag when needed  The weight of a full drainage bag can be painful  Empty the drainage bag every 3 to 6 hours or when it is ? full       · Clean and change the drainage bag as directed  Ask your healthcare provider how often you should change the drainage bag and what cleaning solution to use  Wear disposable gloves when you change the bag  Do not allow the end of the catheter or tubing to touch anything  Clean the ends with an alcohol pad before you reconnect them  What to do if problems develop:   · No urine is draining into the bag:      ¨ Check for kinks in the tubing and straighten them out  ¨ Check the tape or strap used to secure the catheter tube to your skin  Make sure it is not blocking the tube  ¨ Make sure you are not sitting or lying on the tubing  ¨ Make sure the urine bag is hanging below the level of your waist     · Urine leaks from or around the catheter, tubing, or drainage bag:  Check if the closed drainage system has accidently come open or apart  Clean the catheter and tubing ends with a new alcohol pad and reconnect them  Prevent an infection:   · Wash your hands often  Wash before and after you touch your catheter, tubing, or drainage bag  Use soap and water  Wear clean disposable gloves when you care for your catheter or disconnect the drainage bag  Wash your hands before you prepare or eat food  · Drink liquids as directed  Ask your healthcare provider how much liquid to drink each day and which liquids are best for you  Liquids will help flush your kidneys and bladder to help prevent infection  Follow up with your healthcare provider as directed:  Write down your questions so you remember to ask them during your visits  © 2017 [employer] Information is for End User's use only and may not be sold, redistributed or otherwise used for commercial purposes  All illustrations and images included in CareNotes® are the copyrighted property of A [employer] A M , Inc  or [employer]  The above information is an  only  It is not intended as medical advice for individual conditions or treatments   Talk to your doctor, nurse or pharmacist before following any medical regimen to see if it is safe and effective for you  Urinary Leg Bag   WHAT YOU NEED TO KNOW:   What is a urinary leg bag? A urinary leg bag holds urine that drains from your catheter  It fits under your clothes and allows you to do your normal daily activities  How do I use a urinary leg bag? · Wash your hands  before and after you touch your catheter, tubing, or drainage bag  Use soap and water  This reduces the risk of infection  · Strap your leg bag to your thigh or calf  Make sure the straps are comfortable  The straps can cause problems with blood flow in your leg if they are too tight  · Clean the tip of the drainage tube with alcohol  before attaching it to your catheter  This helps prevent bacteria from getting into your catheter  · The connecting tube should not pull on your catheter  Skin breakdown can occur if there is constant pulling on the catheter  · Check the tube often to make sure it is not kinked or twisted  Blockage in the tube can cause urine to back up into your bladder  Your urine must flow straight through the tube into your leg bag  · Always keep the leg bag below your bladder  This prevents urine from the bag going back into your bladder, which may cause an infection  · Empty your leg bag when it is ½ full, or every 3 hours  A full bag may break or disconnect from the catheter  · Change to your bedside bag before you go to bed  Your bedside bag can hold more urine  Do not use your leg bag at night because it could become too full or break  · Clean your leg bag after every use  Fill the bag with 2 parts vinegar and 3 parts water  Let it soak for 20 minutes, then rinse and let dry  Follow your healthcare provider's instruction on replacing your leg bag with a new one  CARE AGREEMENT:   You have the right to help plan your care  Learn about your health condition and how it may be treated  Discuss treatment options with your caregivers to decide what care you want to receive  You always have the right to refuse treatment  The above information is an  only  It is not intended as medical advice for individual conditions or treatments  Talk to your doctor, nurse or pharmacist before following any medical regimen to see if it is safe and effective for you  © 2017 [employer] Information is for End User's use only and may not be sold, redistributed or otherwise used for commercial purposes  All illustrations and images included in CareNotes® are the copyrighted property of A [employer] A M , Inc  or Kristian Davila

## 2018-06-28 NOTE — H&P (VIEW-ONLY)
IPSS Questionnaire (AUA-7): Over the past month    1)  How often have you had a sensation of not emptying your bladder completely after you finish urinating? 3 - About half the time   2)  How often have you had to urinate again less than two hours after you finished urinating? 4 - More than half the time   3)  How often have you found you stopped and started again several times when you urinated? 4 - More than half the time   4) How difficult have you found it to postpone urination? 4 - More than half the time   5) How often have you had a weak urinary stream?  3 - About half the time   6) How often have you had to push or strain to begin urination? 2 - Less than half the time   7) How many times did you most typically get up to urinate from the time you went to bed until the time you got up in the morning? 3 - 3 times   Total Score:  23         Attestation signed by Martínez Ramires MD at 6/11/2018  3:14 PM:  reviewed    Assessment/Plan:    BPH with obstruction/lower urinary tract symptoms  Patient is unhappy with his voiding pattern at this time  His symptoms are interfering with his quality of life  His AUA symptom score is 23  PSA was 0 51 on February 14, 2018  A CT scan done at the [address] [place] [address] in January documents normal upper tracts  The prostate is noted to be enlarged  We again discussed treatment options and he was interested in pursuing photoselective vaporization of the prostate with the GreenLight laser  The procedure was described  Risks, benefits and alternatives were discussed  Consent form was signed  Diagnoses and all orders for this visit:    BPH with obstruction/lower urinary tract symptoms  -     POCT urine dip auto non-scope    Hypertension, benign    Other emphysema ([address] )    Other orders  -     acetaminophen (TYLENOL) 325 mg tablet; Take by mouth  -     albuterol (PROVENTIL HFA,VENTOLIN HFA) 90 mcg/act inhaler;  Inhale  -     mometasone (ASMANEX 60 METERED DOSES) 220 MCG/INH inhaler; Inhale  -     aspirin 81 mg chewable tablet; one daily  -     B Complex Vitamins (VITAMIN B COMPLEX PO); Take 2,500 mcg by mouth  -     buPROPion (WELLBUTRIN) 100 mg tablet; Take 100 mg by mouth  -     calcium carbonate (OS-CRISELDA) 1250 (500 Ca) MG tablet; twice daily  -     Cholecalciferol 2000 units CAPS; Take 1 capsule by mouth  -     co-enzyme Q-10 100 mg capsule; Take 100 mg by mouth  -     Multiple Vitamin (DAILY VALUE MULTIVITAMIN) TABS; Take by mouth  -     Discontinue: diltiazem (CARDIZEM LA) 240 MG 24 hr tablet; Take 240 mg by mouth  -     folic acid (FOLVITE) 1 mg tablet; Take by mouth  -     loratadine (CLARITIN) 10 mg tablet; Take by mouth  -     Albuterol Sulfate (PROAIR RESPICLICK) 797 (90 Base) MCG/ACT AEPB; Inhale  -     tamsulosin (FLOMAX) 0 4 mg; Take 0 4 mg by mouth  -     busPIRone (BUSPAR) 10 mg tablet; Take 20 mg by mouth 2 (two) times a day  -     escitalopram (LEXAPRO) 10 mg tablet; Take 5 mg by mouth daily  -     Flaxseed, Linseed, (FLAX SEED OIL) 1000 MG CAPS; Take by mouth  -     TONALIN SAFFLOWER OIL CLA 1000 MG CAPS; Take by mouth          Subjective:      Patient ID: Marylin Alpers is a 72 y o  male  29-year-old male returns to the office today to discuss further treatment of his voiding symptoms  He is taking 2 Flomax nightly and notes his symptoms continued to get worse  He voids with a slow stream and does not empty his bladder well  He has urgency but no incontinence  He is getting up at least 3 times at night to urinate  There is no gross hematuria, dysuria or symptoms of infection  He feels that his symptoms are interfering with his quality of life          The following portions of the patient's history were reviewed and updated as appropriate: allergies, current medications, past family history, past medical history, past social history, past surgical history and problem list     Review of Systems   Constitutional: Negative for chills, diaphoresis, fatigue and fever    HENT: Negative  Eyes: Negative  Respiratory: Negative  Cardiovascular: Negative  Endocrine: Negative  Musculoskeletal: Negative  Skin: Negative  Allergic/Immunologic: Negative  Neurological: Negative  Hematological: Negative  Psychiatric/Behavioral: Negative  Objective:      /78 (BP Location: Left arm, Patient Position: Sitting)   Ht 5' 8" (1 727 m)   Wt 86 6 kg (191 lb)   BMI 29 04 kg/m²           Physical Exam   Constitutional: He is oriented to person, place, and time  He appears well-developed and well-nourished  HENT:   Head: Normocephalic and atraumatic  Eyes: Conjunctivae are normal    Neck: Neck supple  Cardiovascular: Normal rate  Pulmonary/Chest: Effort normal    Abdominal: He exhibits no distension and no mass  There is no tenderness  There is no rebound and no guarding  No hernia   Genitourinary: Penis normal    Genitourinary Comments: Testes descended and normal to palpation   Neurological: He is alert and oriented to person, place, and time  Skin: Skin is warm and dry  Psychiatric: He has a normal mood and affect  His behavior is normal  Judgment and thought content normal    Nursing note and vitals reviewed

## 2018-06-28 NOTE — INTERVAL H&P NOTE
H&P reviewed  After examining the patient I find no changes in the patients condition since the H&P had been written  Procedure reviewed with patient in holding unit

## 2018-06-28 NOTE — DISCHARGE INSTR - AVS FIRST PAGE
Use medications such as Tylenol or ibuprofen as her first-line pain medication  Ibuprofen can be particularly affective visit is also an anti-inflammatory  It is normal to have some blood in your urine  The catheter should always be draining  Call for fever, severe abdominal pain or heavy urinary bleeding

## 2018-06-28 NOTE — OP NOTE
OPERATIVE REPORT  PATIENT NAME: Stephanie Beltrán    :  1945  MRN: [id number]  Pt Location: [facility] OR ROOM 08    SURGERY DATE: 2018    Surgeon(s) and Role:     * Leslye Mims MD - Primary    Preop Diagnosis:  Enlarged prostate with urinary obstruction [N40 1, N13 8]    Post-Op Diagnosis Codes:     * Enlarged prostate with urinary obstruction [N40 1, N13 8]    Procedure(s) (LRB):  CYSTOSCOPY, PHOTO SELECTIVE VAPORIZATION OF THE PROSTATE (GREENLIGHT LASER) (N/A)    Specimen(s):  * No specimens in log *    Estimated Blood Loss:   Minimal    Drains:  Urethral Catheter Latex; Three way 24 Fr  (Active)   Number of days: 0       Anesthesia Type:   Choice    Operative Indications:  Enlarged prostate with urinary obstruction [N40 1, N13 8]      Operative Findings:  Normal urethra with trilobar prostatic hypertrophy causing complete outflow obstruction  The prostate was short with obstructing lateral and median lobes   Ureteral orifices are normal   The bladder is mild to moderately trabeculated  There are no stones tumors or diverticula  Complications:   None    Procedure and Technique:  The patient was brought to the operating room and properly identified  General anesthesia was administered he was placed in lithotomy position and prepped and draped in the usual sterile fashion  Compression boots were employed  Intravenous antibiotic administered  An appropriate time-out was performed  Cystourethroscopy was performed with 23 [place]hi continuous-flow cystoscope  Findings are as above  Ureteral orifices and verumontanum were identified  The GreenLight laser fiber was placed into the bladder  Landmarks were again identified  The median lobe was taken down to level of capsular fibers with the GreenLight laser at 80 w power  Right and left lateral lobes were then vaporized  The power was raised once the bladder neck had been treated and the lateral lobes were vaporized down to capsular fibers    Anterior and apical tissue was then lasered with care to vaporize no further distally than the verumontanum  An excellent channel was created  When vaporization was complete, the bladder was inspected  The ureteral orifices were unaffected by laser energy  Hemostasis was adequate  The cystoscope was removed under direct vision  A 24 [place]e Piedra catheter was then placed into the bladder easily with and the balloon filled to 40 cc  Catheter irrigated well and irrigant cleared nicely  The patient tolerated the procedure well  Blood loss was minimal   The Piedra catheter was placed to straight drainage  The patient was taken to the recovery room in satisfactory condition     I was present for the entire procedure    Patient Disposition:  PACU     SIGNATURE: Sheng Anne MD  DATE: June 28, 2018  TIME: 2:12 PM

## 2018-06-28 NOTE — ANESTHESIA POSTPROCEDURE EVALUATION
Post-Op Assessment Note      CV Status:  Stable    Mental Status:  Alert and awake    Hydration Status:  Euvolemic    PONV Controlled:  Controlled    Airway Patency:  Patent    Post Op Vitals Reviewed:  Yes              BP      Temp      Pulse    Resp      SpO2

## 2018-07-02 ENCOUNTER — CLINICAL SUPPORT (OUTPATIENT)
Dept: UROLOGY | Facility: MEDICAL CENTER | Age: 73
End: 2018-07-02
Payer: MEDICARE

## 2018-07-02 VITALS
HEIGHT: 68 IN | DIASTOLIC BLOOD PRESSURE: 80 MMHG | SYSTOLIC BLOOD PRESSURE: 140 MMHG | WEIGHT: 187 LBS | BODY MASS INDEX: 28.34 KG/M2

## 2018-07-02 DIAGNOSIS — N40.1 ENLARGED PROSTATE WITH URINARY OBSTRUCTION: Primary | ICD-10-CM

## 2018-07-02 DIAGNOSIS — N13.8 ENLARGED PROSTATE WITH URINARY OBSTRUCTION: Primary | ICD-10-CM

## 2018-07-02 PROCEDURE — 99024 POSTOP FOLLOW-UP VISIT: CPT

## 2018-07-02 NOTE — PROGRESS NOTES
Procedures  Patient returns for Piedra removal  Piedra removed without difficulty, patient tolerated procedure well  Urine draining  Denies fever or urinary symptoms  Patient instructed to increase fluids and limit caffeine intake  To return to office if unable to void within 4-6 hours, or has increased discomfort  Patient has appoint July 23 with Dr Humble Smith to follow up

## 2018-07-23 ENCOUNTER — OFFICE VISIT (OUTPATIENT)
Dept: UROLOGY | Facility: MEDICAL CENTER | Age: 73
End: 2018-07-23
Payer: MEDICARE

## 2018-07-23 VITALS
BODY MASS INDEX: 28.79 KG/M2 | WEIGHT: 190 LBS | HEIGHT: 68 IN | SYSTOLIC BLOOD PRESSURE: 128 MMHG | DIASTOLIC BLOOD PRESSURE: 80 MMHG

## 2018-07-23 DIAGNOSIS — N13.8 BPH WITH OBSTRUCTION/LOWER URINARY TRACT SYMPTOMS: Primary | ICD-10-CM

## 2018-07-23 DIAGNOSIS — N40.1 BPH WITH OBSTRUCTION/LOWER URINARY TRACT SYMPTOMS: Primary | ICD-10-CM

## 2018-07-23 PROBLEM — K21.9 GASTROESOPHAGEAL REFLUX DISEASE WITHOUT ESOPHAGITIS: Status: ACTIVE | Noted: 2018-06-13

## 2018-07-23 LAB
SL AMB  POCT GLUCOSE, UA: ABNORMAL
SL AMB LEUKOCYTE ESTERASE,UA: ABNORMAL
SL AMB POCT BILIRUBIN,UA: ABNORMAL
SL AMB POCT BLOOD,UA: ABNORMAL
SL AMB POCT CLARITY,UA: CLEAR
SL AMB POCT COLOR,UA: YELLOW
SL AMB POCT KETONES,UA: ABNORMAL
SL AMB POCT NITRITE,UA: ABNORMAL
SL AMB POCT PH,UA: 6
SL AMB POCT SPECIFIC GRAVITY,UA: 1
SL AMB POCT URINE PROTEIN: ABNORMAL
SL AMB POCT UROBILINOGEN: 0.2

## 2018-07-23 PROCEDURE — 81003 URINALYSIS AUTO W/O SCOPE: CPT | Performed by: UROLOGY

## 2018-07-23 PROCEDURE — 99024 POSTOP FOLLOW-UP VISIT: CPT | Performed by: UROLOGY

## 2018-07-23 NOTE — LETTER
July 23, 2018     Marcos Royal DO  [facility] [id number]    Patient: Gloria Mahoney   YOB: 1945   Date of Visit: 7/23/2018       Dear Dr Charlette Beckford: Thank you for referring Jackeline Duff to me for evaluation  Below are my notes for this consultation  If you have questions, please do not hesitate to call me  I look forward to following your patient along with you           Sincerely,        Santi Minor MD        CC: No Recipients

## 2018-07-23 NOTE — PROGRESS NOTES
Assessment/Plan:    BPH with obstruction/lower urinary tract symptoms  He is voiding better post GreenLight laser  His stream is good  He still has some postoperative discomfort  I expect this to resolve over the next several weeks  At approximately 6 weeks postop he will discontinue his tamsulosin  He will return in 3 months  Diagnoses and all orders for this visit:    BPH with obstruction/lower urinary tract symptoms  -     POCT urine dip auto non-scope          Subjective:      Patient ID: Indra Clement is a 72 y o  male  29-year-old male who is now almost 1 month post photoselective vaporization of prostate with the GreenLight laser  He reports his stream is good and he is emptying well  He continues to take tamsulosin  There is no gross hematuria  He has no fever or flank pain  He feels he is emptying well  He still notes some dysuria at the end of urination and some mild discomfort with voiding  Overall he is pleased with the results of his surgery  The following portions of the patient's history were reviewed and updated as appropriate: allergies, current medications, past family history, past medical history, past social history, past surgical history and problem list     Review of Systems   Constitutional: Negative for diaphoresis, fatigue and fever  HENT: Negative  Eyes: Negative  Respiratory: Negative  Cardiovascular: Negative  Endocrine: Negative  Musculoskeletal: Negative  Skin: Negative  Allergic/Immunologic: Negative  Neurological: Negative  Hematological: Negative  Psychiatric/Behavioral: Negative  Objective:      /80 (BP Location: Left arm, Patient Position: Sitting)   Ht 5' 8" (1 727 m)   Wt 86 2 kg (190 lb)   BMI 28 89 kg/m²          Physical Exam   Constitutional: He is oriented to person, place, and time  He appears well-developed and well-nourished  HENT:   Head: Normocephalic and atraumatic     Eyes: Conjunctivae are normal    Neck: Neck supple  Cardiovascular: Normal rate  Pulmonary/Chest: Effort normal    Abdominal: Soft  He exhibits no distension and no mass  There is no tenderness  There is no rebound and no guarding  No hernia   Genitourinary: Penis normal    Genitourinary Comments: Meatus adequate  Testes descended and normal to palpation   Neurological: He is alert and oriented to person, place, and time  Skin: Skin is warm and dry  Psychiatric: He has a normal mood and affect  His behavior is normal  Judgment and thought content normal    Vitals reviewed

## 2018-07-23 NOTE — ASSESSMENT & PLAN NOTE
He is voiding better post GreenLight laser  His stream is good  He still has some postoperative discomfort  I expect this to resolve over the next several weeks  At approximately 6 weeks postop he will discontinue his tamsulosin  He will return in 3 months

## 2018-07-26 ENCOUNTER — TELEPHONE (OUTPATIENT)
Dept: UROLOGY | Facility: MEDICAL CENTER | Age: 73
End: 2018-07-26

## 2018-07-26 NOTE — TELEPHONE ENCOUNTER
Called to inform the patient that his records have been printed and will be ready to be picked up when he comes tomorrow

## 2018-10-22 ENCOUNTER — OFFICE VISIT (OUTPATIENT)
Dept: UROLOGY | Facility: MEDICAL CENTER | Age: 73
End: 2018-10-22
Payer: MEDICARE

## 2018-10-22 VITALS
HEIGHT: 68 IN | DIASTOLIC BLOOD PRESSURE: 60 MMHG | WEIGHT: 191 LBS | BODY MASS INDEX: 28.95 KG/M2 | SYSTOLIC BLOOD PRESSURE: 122 MMHG

## 2018-10-22 DIAGNOSIS — N13.8 BPH WITH OBSTRUCTION/LOWER URINARY TRACT SYMPTOMS: Primary | ICD-10-CM

## 2018-10-22 DIAGNOSIS — N40.1 BPH WITH OBSTRUCTION/LOWER URINARY TRACT SYMPTOMS: Primary | ICD-10-CM

## 2018-10-22 LAB
SL AMB  POCT GLUCOSE, UA: NORMAL
SL AMB LEUKOCYTE ESTERASE,UA: NORMAL
SL AMB POCT BILIRUBIN,UA: NORMAL
SL AMB POCT BLOOD,UA: NORMAL
SL AMB POCT CLARITY,UA: CLEAR
SL AMB POCT COLOR,UA: YELLOW
SL AMB POCT KETONES,UA: NORMAL
SL AMB POCT NITRITE,UA: NORMAL
SL AMB POCT PH,UA: 6
SL AMB POCT SPECIFIC GRAVITY,UA: 1.01
SL AMB POCT URINE PROTEIN: NORMAL
SL AMB POCT UROBILINOGEN: 0.2

## 2018-10-22 PROCEDURE — 81003 URINALYSIS AUTO W/O SCOPE: CPT | Performed by: UROLOGY

## 2018-10-22 PROCEDURE — 99213 OFFICE O/P EST LOW 20 MIN: CPT | Performed by: UROLOGY

## 2018-10-22 NOTE — LETTER
October 22, 2018     Fan Sims DO  [address] [facility] [id number]    Patient: Tish Anthony   YOB: 1945   Date of Visit: 10/22/2018       Dear Dr Beatrice Hoff: Thank you for referring Niranjan Tagn to me for evaluation  Below are my notes for this consultation  If you have questions, please do not hesitate to call me  I look forward to following your patient along with you  Sincerely,        Cheo Crawford MD        CC: No Recipients  Cheo Crawford MD  10/22/2018 11:24 AM  Sign at close encounter  Assessment/Plan:    BPH with obstruction/lower urinary tract symptoms  Irritative sx have improved and he is now satisfied with his voiding pattern post GLL  AUA ss has improved from 23 to 11  He is now off tamsulosin  He will return in 8 months for follow-up  Diagnoses and all orders for this visit:    BPH with obstruction/lower urinary tract symptoms          Subjective:      Patient ID: Tish Anthony is a 72 y o  male  Chief complaint:  Lower tract symptoms    HPI:  68-year-old male who is now 4 months post GreenLight laser photo ablation of the prostate  He reports he is voiding well  His stream is good and he empties adequately  He denies gross hematuria, dysuria or symptoms of infection  Since his last visit his dysuria and irritative symptoms have resolved  He is now satisfied with his voiding pattern  He continues get up twice a night to urinate  He does drink a lot of fluids in the evening  He does note retrograde ejaculation  Overall he is pleased with the results of the procedure  The following portions of the patient's history were reviewed and updated as appropriate: allergies, current medications, past family history, past medical history, past social history, past surgical history and problem list     Review of Systems   Constitutional: Negative for chills, diaphoresis, fatigue and fever  HENT: Negative      Eyes: Negative  Respiratory: Negative  Cardiovascular: Negative  Endocrine: Negative  Musculoskeletal: Negative  Skin: Negative  Allergic/Immunologic: Negative  Neurological: Negative  Hematological: Negative  Psychiatric/Behavioral: Negative  AUA SYMPTOM SCORE      Most Recent Value   AUA SYMPTOM SCORE   How often have you had a sensation of not emptying your bladder completely after you finished urinating? 2   How often have you had to urinate again less than two hours after you finished urinating? 2   How often have you found you stopped and started again several times when you urinate? 1   How often have you found it difficult to postpone urination? 1   How often have you had a weak urinary stream?  2   How often have you had to push or strain to begin urination? 1   How many times did you most typically get up to urinate from the time you went to bed at night until the time you got up in the morning? 2   Quality of Life: If you were to spend the rest of your life with your urinary condition just the way it is now, how would you feel about that?  2   AUA SYMPTOM SCORE  11        Objective:      /60 (BP Location: Left arm, Patient Position: Sitting)   Ht 5' 8" (1 727 m)   Wt 86 6 kg (191 lb)   BMI 29 04 kg/m²           Physical Exam   Constitutional: He is oriented to person, place, and time  He appears well-developed and well-nourished  No distress  HENT:   Head: Normocephalic and atraumatic  Eyes: Conjunctivae are normal    Neck: Neck supple  Cardiovascular: Normal rate  Pulmonary/Chest: Effort normal    Abdominal: Soft  Bowel sounds are normal  He exhibits no distension and no mass  There is no tenderness  There is no rebound, no guarding and no CVA tenderness  Genitourinary: Rectum normal, testes normal and penis normal  Right testis shows no mass  Left testis shows no mass  No phimosis or hypospadias     Genitourinary Comments: Testes descended and nontender   Musculoskeletal: He exhibits no edema  No CVAT   Neurological: He is alert and oriented to person, place, and time  Skin: Skin is warm and dry  He is not diaphoretic  Psychiatric: He has a normal mood and affect   His behavior is normal  Judgment and thought content normal

## 2018-10-22 NOTE — ASSESSMENT & PLAN NOTE
Irritative sx have improved and he is now satisfied with his voiding pattern post GLL  AUA ss has improved from 23 to 11  He is now off tamsulosin  He will return in 8 months for follow-up

## 2018-10-22 NOTE — PATIENT INSTRUCTIONS
Benign Prostatic Hypertrophy   WHAT YOU NEED TO KNOW:   Benign prostatic hypertrophy (BPH) is a condition that causes your prostate gland to grow larger than normal  The prostate gland is the male sex gland that produces a fluid that is part of semen  It is about the size of a walnut and it is located under the bladder  As the prostate grows, it can squeeze the urethra  This can block urine flow and cause urinary problems  DISCHARGE INSTRUCTIONS:   Medicines:   · Alpha blockers: This medicine relaxes the muscles in your prostate and bladder  It may help you urinate more easily  · 5 alpha reductase inhibitors: These medicines block the production of a hormone that causes the prostate to get larger  It may help slow the growth of the prostate or shrink the prostate  · Take your medicine as directed  Contact your healthcare provider if you think your medicine is not helping or if you have side effects  Tell him or her if you are allergic to any medicine  Keep a list of the medicines, vitamins, and herbs you take  Include the amounts, and when and why you take them  Bring the list or the pill bottles to follow-up visits  Carry your medicine list with you in case of an emergency  Follow up with your healthcare provider as directed:  Write down your questions so you remember to ask them during your visits  Manage BPH:   · Do not let your bladder get too full before you empty it  Urinate when you feel the urge  · Limit alcohol  Do not drink large amounts of any liquid at one time  · Decrease the amount of salt you eat  Examples of salty foods are chips, cured meats, and canned soups  Do not use table salt  · Healthcare providers may tell you not to eat spicy foods such as chilli peppers  This may help you find out if spicy food makes your BPH symptoms worse  · You may have sex if you feel well  Contact your healthcare provider if:   · There is a large amount of blood in your urine  · Your signs and symptoms get worse  · You have a fever  · You have questions or concerns about your condition or care  Seek care immediately if:   · You are unable to urinate  · Your bladder feels very full and painful  © 2017 [employer] Information is for End User's use only and may not be sold, redistributed or otherwise used for commercial purposes  All illustrations and images included in CareNotes® are the copyrighted property of [employer]  or Kristian Davila  The above information is an  only  It is not intended as medical advice for individual conditions or treatments  Talk to your doctor, nurse or pharmacist before following any medical regimen to see if it is safe and effective for you

## 2018-10-22 NOTE — PROGRESS NOTES
Assessment/Plan:    BPH with obstruction/lower urinary tract symptoms  Irritative sx have improved and he is now satisfied with his voiding pattern post GLL  AUA ss has improved from 23 to 11  He is now off tamsulosin  He will return in 8 months for follow-up  Diagnoses and all orders for this visit:    BPH with obstruction/lower urinary tract symptoms          Subjective:      Patient ID: Becky Naidu is a 72 y o  male  Chief complaint:  Lower tract symptoms    HPI:  68-year-old male who is now 4 months post GreenLight laser photo ablation of the prostate  He reports he is voiding well  His stream is good and he empties adequately  He denies gross hematuria, dysuria or symptoms of infection  Since his last visit his dysuria and irritative symptoms have resolved  He is now satisfied with his voiding pattern  He continues get up twice a night to urinate  He does drink a lot of fluids in the evening  He does note retrograde ejaculation  Overall he is pleased with the results of the procedure  The following portions of the patient's history were reviewed and updated as appropriate: allergies, current medications, past family history, past medical history, past social history, past surgical history and problem list     Review of Systems   Constitutional: Negative for chills, diaphoresis, fatigue and fever  HENT: Negative  Eyes: Negative  Respiratory: Negative  Cardiovascular: Negative  Endocrine: Negative  Musculoskeletal: Negative  Skin: Negative  Allergic/Immunologic: Negative  Neurological: Negative  Hematological: Negative  Psychiatric/Behavioral: Negative  AUA SYMPTOM SCORE      Most Recent Value   AUA SYMPTOM SCORE   How often have you had a sensation of not emptying your bladder completely after you finished urinating? 2   How often have you had to urinate again less than two hours after you finished urinating?   2   How often have you found you stopped and started again several times when you urinate? 1   How often have you found it difficult to postpone urination? 1   How often have you had a weak urinary stream?  2   How often have you had to push or strain to begin urination? 1   How many times did you most typically get up to urinate from the time you went to bed at night until the time you got up in the morning? 2   Quality of Life: If you were to spend the rest of your life with your urinary condition just the way it is now, how would you feel about that?  2   AUA SYMPTOM SCORE  11        Objective:      /60 (BP Location: Left arm, Patient Position: Sitting)   Ht 5' 8" (1 727 m)   Wt 86 6 kg (191 lb)   BMI 29 04 kg/m²          Physical Exam   Constitutional: He is oriented to person, place, and time  He appears well-developed and well-nourished  No distress  HENT:   Head: Normocephalic and atraumatic  Eyes: Conjunctivae are normal    Neck: Neck supple  Cardiovascular: Normal rate  Pulmonary/Chest: Effort normal    Abdominal: Soft  Bowel sounds are normal  He exhibits no distension and no mass  There is no tenderness  There is no rebound, no guarding and no CVA tenderness  Genitourinary: Rectum normal, testes normal and penis normal  Right testis shows no mass  Left testis shows no mass  No phimosis or hypospadias  Genitourinary Comments: Testes descended and nontender   Musculoskeletal: He exhibits no edema  No CVAT   Neurological: He is alert and oriented to person, place, and time  Skin: Skin is warm and dry  He is not diaphoretic  Psychiatric: He has a normal mood and affect   His behavior is normal  Judgment and thought content normal

## 2019-06-19 ENCOUNTER — OFFICE VISIT (OUTPATIENT)
Dept: UROLOGY | Facility: MEDICAL CENTER | Age: 74
End: 2019-06-19
Payer: MEDICARE

## 2019-06-19 VITALS
SYSTOLIC BLOOD PRESSURE: 138 MMHG | WEIGHT: 173 LBS | DIASTOLIC BLOOD PRESSURE: 88 MMHG | BODY MASS INDEX: 26.3 KG/M2 | HEART RATE: 66 BPM

## 2019-06-19 DIAGNOSIS — N13.8 BPH WITH OBSTRUCTION/LOWER URINARY TRACT SYMPTOMS: Primary | ICD-10-CM

## 2019-06-19 DIAGNOSIS — N40.1 BPH WITH OBSTRUCTION/LOWER URINARY TRACT SYMPTOMS: Primary | ICD-10-CM

## 2019-06-19 PROCEDURE — 99213 OFFICE O/P EST LOW 20 MIN: CPT | Performed by: PHYSICIAN ASSISTANT

## 2019-06-19 RX ORDER — VALPROIC ACID 250 MG/5ML
250 SOLUTION ORAL 3 TIMES DAILY
COMMUNITY

## 2022-03-17 NOTE — PATIENT INSTRUCTIONS
Laser Prostatectomy   WHAT YOU NEED TO KNOW:   Laser prostatectomy is a surgery that uses light beams to destroy part of the prostate gland  This can help reduce urinary problems caused by an enlarged prostate  DISCHARGE INSTRUCTIONS:   Medicines:   · Medicines  can help decrease pain or swelling  You may also need medicine to prevent or treat a bacterial infection  · Take your medicine as directed  Contact your healthcare provider if you think your medicine is not helping or if you have side effects  Tell him if you are allergic to any medicine  Keep a list of the medicines, vitamins, and herbs you take  Include the amounts, and when and why you take them  Bring the list or the pill bottles to follow-up visits  Carry your medicine list with you in case of an emergency  Follow up with your healthcare provider or surgeon as directed:  Write down your questions so you remember to ask them during your visits  Bladder care:   · Empty your bladder on a regular basis  Try to urinate every 3 hours while you are awake  Do not let your bladder become too full  Urinate as soon as you feel the need  Do not drink liquids before you go to bed  Urinate right before you go to bed  · Follow instructions for catheterization  You may need to catheterize yourself if you cannot urinate on your own  Ask for more information on self-catheterization  Self-care:   · Rest as needed  Slowly begin doing more each day  Return to your daily activities as directed  · Prevent constipation  Eat foods that are high in fiber, and drink more liquids  High-fiber foods, such as fruits, vegetables, and whole grains, will help soften your bowel movements  Regular exercise and extra liquids also help prevent constipation  · Ask your healthcare provider when it is safe to have sex  Do not get sexually aroused without ejaculating because your urethra may get blocked  · Do not smoke    If you smoke, it is never too late to Personally reviewed patients records from referring Physician. quit  Smoking increases the time it takes to heal  Ask your healthcare provider for information about how to stop smoking if you need help  Contact your healthcare provider or surgeon if:   · You have a fever  · Your symptoms get worse  · You have blood in your urine  · You have chills, a cough, or feel weak and achy  · You are dizzy, have nausea, or are vomiting  · You have questions or concerns about your condition or care  Seek care immediately or call 911 if:   · You cannot urinate, or if you have a catheter, no urine is filling the bag  · Your catheter comes out of your urethra  · You have lower abdominal or back pain that does not go away  · You have redness, pain, blood, or drainage where the catheter enters your penis  · Your urine is red, cloudy, and foul smelling  © 2017 [employer]  Information is for End User's use only and may not be sold, redistributed or otherwise used for commercial purposes  All illustrations and images included in CareNotes® are the copyrighted property of [employer]  or Kristian Davila  The above information is an  only  It is not intended as medical advice for individual conditions or treatments  Talk to your doctor, nurse or pharmacist before following any medical regimen to see if it is safe and effective for you

## 2022-05-02 ENCOUNTER — TELEPHONE (OUTPATIENT)
Dept: UROLOGY | Facility: AMBULATORY SURGERY CENTER | Age: 77
End: 2022-05-02

## 2022-05-02 NOTE — TELEPHONE ENCOUNTER
He has not been seen in almost 3 years  We don't know if he's emptying his bladder or anything else about his recent health history  I would not recommend any medication until he is seen for follow up

## 2022-05-02 NOTE — TELEPHONE ENCOUNTER
PT under care of: Yuan Redd      Pt last seen: 06/19/19     PT calling today because/symptoms are: Pt had a laser done in 2018 with Dr Yuan Redd  The pat stated that he is still having problems with going a lot up to 5 times a night  He wants to know if there is any medication that he could be put on help   He didn't want to be seen until June or July     PT can be reached at: [phone]

## 2022-05-02 NOTE — TELEPHONE ENCOUNTER
Call placed to patient and spoke with him  Informed him of the recommendations of Dr Stella Wang at this time  Pt is aware and scheduled for follow up on 5- at 8:15am with AP to discuss his symptoms and concerns

## 2022-05-11 ENCOUNTER — OFFICE VISIT (OUTPATIENT)
Dept: UROLOGY | Facility: MEDICAL CENTER | Age: 77
End: 2022-05-11
Payer: MEDICARE

## 2022-05-11 VITALS
BODY MASS INDEX: 27.13 KG/M2 | HEART RATE: 76 BPM | DIASTOLIC BLOOD PRESSURE: 80 MMHG | WEIGHT: 179 LBS | SYSTOLIC BLOOD PRESSURE: 140 MMHG | HEIGHT: 68 IN

## 2022-05-11 DIAGNOSIS — N52.9 ERECTILE DYSFUNCTION, UNSPECIFIED ERECTILE DYSFUNCTION TYPE: ICD-10-CM

## 2022-05-11 DIAGNOSIS — N40.0 BENIGN PROSTATIC HYPERPLASIA WITHOUT LOWER URINARY TRACT SYMPTOMS: Primary | ICD-10-CM

## 2022-05-11 LAB
SL AMB  POCT GLUCOSE, UA: NORMAL
SL AMB LEUKOCYTE ESTERASE,UA: NORMAL
SL AMB POCT BILIRUBIN,UA: NORMAL
SL AMB POCT BLOOD,UA: NORMAL
SL AMB POCT CLARITY,UA: CLEAR
SL AMB POCT COLOR,UA: YELLOW
SL AMB POCT KETONES,UA: NORMAL
SL AMB POCT NITRITE,UA: NORMAL
SL AMB POCT PH,UA: 7
SL AMB POCT SPECIFIC GRAVITY,UA: 1.01
SL AMB POCT URINE PROTEIN: NORMAL
SL AMB POCT UROBILINOGEN: 0.2

## 2022-05-11 PROCEDURE — 81003 URINALYSIS AUTO W/O SCOPE: CPT | Performed by: PHYSICIAN ASSISTANT

## 2022-05-11 PROCEDURE — 99214 OFFICE O/P EST MOD 30 MIN: CPT | Performed by: PHYSICIAN ASSISTANT

## 2022-05-11 RX ORDER — ALFUZOSIN HYDROCHLORIDE 10 MG/1
10 TABLET, EXTENDED RELEASE ORAL DAILY
Qty: 30 TABLET | Refills: 2 | Status: SHIPPED | OUTPATIENT
Start: 2022-05-11

## 2022-05-11 RX ORDER — TADALAFIL 20 MG/1
TABLET ORAL
Qty: 30 TABLET | Refills: 3 | Status: SHIPPED | OUTPATIENT
Start: 2022-05-11

## 2022-05-11 RX ORDER — LORAZEPAM 1 MG/1
1 TABLET ORAL
COMMUNITY
Start: 2022-03-30

## 2022-05-11 RX ORDER — FLUTICASONE PROPIONATE AND SALMETEROL 500; 50 UG/1; UG/1
POWDER RESPIRATORY (INHALATION)
COMMUNITY

## 2022-05-11 RX ORDER — DEXTRAN 70/HYPROMELLOSE
DROPS OPHTHALMIC (EYE)
COMMUNITY
Start: 2022-03-02

## 2022-05-11 RX ORDER — AMLODIPINE BESYLATE 10 MG/1
1 TABLET ORAL DAILY
COMMUNITY
Start: 2022-01-03

## 2022-05-11 RX ORDER — ACETAMINOPHEN 160 MG
TABLET,DISINTEGRATING ORAL
COMMUNITY

## 2022-05-11 NOTE — PROGRESS NOTES
5/11/2022      Chief Complaint   Patient presents with    Benign Prostatic Hypertrophy     Nocturia           Assessment and Plan    78 y o  male managed by Dr Sara Ross     1  BPH with LUTS  · Urine today: negative]  · AUA score: 19    · S/p Greenlight laser on 6/28/18  · Refusing repeat cystoscopy for further evaluation  · Will try alfuzosin (Uroxtral) 10 mg po daily  · Reviewed bladder irritants  · Encouraged patient to discontinue fluids after dinner time  · Follow up in 6-8 weeks on new med  2  Erectile dysfunction  · Notes decreased effectiveness in sidlenafil  Would like to try an alternative  · Will try Cialis 20 mg po Q3-4 days as needed  · Sent to pharmacy with [employer]  History of Present Illness  Donald Hoff is a 78 y o  male here for evaluation of BPH and ED  patient is s/p GreenLight laser procedure on 06/28/2018 with Dr Sara Ross  Patient states over the past few months he seems to be back to subcutaneously 1 with his urinary symptoms  He states his nocturia increased up to 5 times per night  He notes occasional straining to void as well as occasional weak and intermittent stream   He does note daytime urinary frequency but states this is directly exacerbated by bladder irritants such as ice tea or coffee  He does note urinary urgency but only has experienced occasional minor urge urinary incontinence  States that overall his urinary symptoms seem to be worse at night time  He denies any dysuria or gross hematuria  He denies any flank or abdominal pain  He denies any recent fevers or chills  He also notes today that his sildenafil is not as effective as it used to be  States he is able to obtain somewhat of an erection is able to penetrate but states that it takes longer for him to obtain an erection  Review of Systems   Constitutional: Negative for chills and fever  HENT: Negative  Respiratory: Negative  Cardiovascular: Negative  Gastrointestinal: Negative for abdominal pain, nausea and vomiting  Genitourinary: Positive for difficulty urinating (Occasionally strains), frequency (exacerbated with bladder irritants per patient) and urgency (with very minor urge urinary incontinence)  Negative for dysuria, flank pain, hematuria, scrotal swelling and testicular pain  Nocturia 5x per night  Occasional post void dribbling  Unsure if he complete empties his bladder  Does report both weak and intermittent stream at times  Overall urinary symptoms worse at night  Musculoskeletal: Negative  Skin: Negative  Neurological: Negative  AUA SYMPTOM SCORE    Flowsheet Row Most Recent Value   AUA SYMPTOM SCORE    How often have you had a sensation of not emptying your bladder completely after you finished urinating? 2 (P)     How often have you had to urinate again less than two hours after you finished urinating? 5 (P)     How often have you found you stopped and started again several times when you urinate? 2 (P)     How often have you found it difficult to postpone urination? 3 (P)     How often have you had a weak urinary stream? 2 (P)     How often have you had to push or strain to begin urination? 2 (P)     How many times did you most typically get up to urinate from the time you went to bed at night until the time you got up in the morning? 3 (P)     Quality of Life: If you were to spend the rest of your life with your urinary condition just the way it is now, how would you feel about that? 4 (P)     AUA SYMPTOM SCORE 19 (P)            Vitals  Vitals:    05/11/22 0826   BP: 140/80   Pulse: 76   Weight: 81 2 kg (179 lb)   Height: 5' 8" (1 727 m)       Physical Exam  Vitals reviewed  Constitutional:       General: He is not in acute distress  Appearance: Normal appearance  He is not ill-appearing or toxic-appearing  HENT:      Head: Normocephalic and atraumatic     Eyes:      Conjunctiva/sclera: Conjunctivae normal    Pulmonary:      Effort: Pulmonary effort is normal  No respiratory distress  Abdominal:      General: There is no distension  Palpations: Abdomen is soft  Tenderness: There is no abdominal tenderness  There is no right CVA tenderness, left CVA tenderness, guarding or rebound  Musculoskeletal:         General: Normal range of motion  Cervical back: Normal range of motion  Skin:     General: Skin is warm and dry  Neurological:      General: No focal deficit present  Mental Status: He is alert and oriented to person, place, and time  Psychiatric:         Mood and Affect: Mood normal          Behavior: Behavior normal          Thought Content:  Thought content normal          Judgment: Judgment normal        Past History  Past Medical History:   Diagnosis Date    BPH with obstruction/lower urinary tract symptoms     TURP today 2018    Cataract     Chronic bronchitis (HCC)     Diverticulosis     Emphysema lung (HCC)     Erectile dysfunction     Feeling of incomplete bladder emptying     Full dentures     upper    Hearing aid worn     b/l ears    Hypertension     Malaria     age 21-treated with antibiotics    Nocturia     Poor urinary stream     PTSD (post-traumatic stress disorder)     Pulmonary emphysema (HCC)     Serous retinal detachment      right eye-resolved w/ surgery    Tarsal tunnel syndrome     left    Tinnitus     Wears glasses     Wears partial dentures     lower     Social History     Socioeconomic History    Marital status: /Civil Union     Spouse name: None    Number of children: None    Years of education: None    Highest education level: None   Occupational History    None   Tobacco Use    Smoking status: Former Smoker     Packs/day: 2 00     Years: 38 00     Pack years: 76 00     Quit date:      Years since quittin 3    Smokeless tobacco: Never Used    Tobacco comment: quit 13 years   Substance and Sexual Activity  Alcohol use: No    Drug use: No    Sexual activity: None   Other Topics Concern    None   Social History Narrative    None     Social Determinants of Health     Financial Resource Strain: Not on file   Food Insecurity: Not on file   Transportation Needs: Not on file   Physical Activity: Not on file   Stress: Not on file   Social Connections: Not on file   Intimate Partner Violence: Not on file   Housing Stability: Not on file     Social History     Tobacco Use   Smoking Status Former Smoker    Packs/day: 2 00    Years: 38 00    Pack years: 76 00    Quit date:     Years since quittin 3   Smokeless Tobacco Never Used   Tobacco Comment    quit 15 years     Family History   Problem Relation Age of Onset    Liver disease Mother     Other Father         bladder cancer    Alcohol abuse Family        The following portions of the patient's history were reviewed and updated as appropriate: allergies, current medications, past medical history, past social history, past surgical history and problem list     Results  No results found for this or any previous visit (from the past 1 hour(s)) ]  Lab Results   Component Value Date    PSA 0 4 2018     Lab Results   Component Value Date    CALCIUM 9 2 2018    K 4 2 2018    CO2 27 2018     2018    BUN 24 2018    CREATININE 1 15 2018     Lab Results   Component Value Date    WBC 6 75 2018    HGB 14 4 2018    HCT 41 9 2018    MCV 99 (H) 2018     2018     Michael Walker PA-C

## 2022-05-11 NOTE — PATIENT INSTRUCTIONS
Tadalafil (Cialis)- only take 1 tablet every 3 to 4 days as needed  Take 1 hour prior to sexual activity  Important to take only 1 tablet  Do not take sildenafil at the same time

## 2022-05-25 ENCOUNTER — NURSE TRIAGE (OUTPATIENT)
Dept: OTHER | Facility: OTHER | Age: 77
End: 2022-05-25

## 2022-05-25 NOTE — TELEPHONE ENCOUNTER
Reason for Disposition   Caller has NON-URGENT medicine question about med that PCP or specialist prescribed and triager unable to answer question    Answer Assessment - Initial Assessment Questions  1  NAME of MEDICATION: "What medicine are you calling about?"      Cialis 20 mg, Alfuzosin 10 mg   2  QUESTION: "What is your question?" (e g , medication refill, side effect)      Alba Lane from [facility] called, asked Rx Cialis 20 mg, Alfuzosin 10 mg to be faxed to [phone]   3  PRESCRIBING HCP: "Who prescribed it?" Reason: if prescribed by specialist, call should be referred to that group        Urologist    Protocols used: MEDICATION QUESTION CALL-ADULT-[place]

## 2022-05-25 NOTE — TELEPHONE ENCOUNTER
Called pt to ascertain where he wants his prescriptions filled  VM left to return call to office  Cialis and alfuzosin were both called in to [facility] on 5/11/22  If pt wants these prescriptions filled by the [place] we would have to cancel the [facility] orders and have them sent to the [place]  Clarification is needed  [facility] and stated [facility] did not know [facility]'s wishes

## 2022-07-13 ENCOUNTER — OFFICE VISIT (OUTPATIENT)
Dept: UROLOGY | Facility: MEDICAL CENTER | Age: 77
End: 2022-07-13
Payer: MEDICARE

## 2022-07-13 VITALS
SYSTOLIC BLOOD PRESSURE: 142 MMHG | HEIGHT: 68 IN | WEIGHT: 182 LBS | DIASTOLIC BLOOD PRESSURE: 80 MMHG | BODY MASS INDEX: 27.58 KG/M2

## 2022-07-13 DIAGNOSIS — N52.9 ERECTILE DYSFUNCTION, UNSPECIFIED ERECTILE DYSFUNCTION TYPE: ICD-10-CM

## 2022-07-13 DIAGNOSIS — N40.0 BENIGN PROSTATIC HYPERPLASIA WITHOUT LOWER URINARY TRACT SYMPTOMS: Primary | ICD-10-CM

## 2022-07-13 LAB — POST-VOID RESIDUAL VOLUME, ML POC: 15 ML

## 2022-07-13 PROCEDURE — 99213 OFFICE O/P EST LOW 20 MIN: CPT | Performed by: PHYSICIAN ASSISTANT

## 2022-07-13 PROCEDURE — 51798 US URINE CAPACITY MEASURE: CPT | Performed by: PHYSICIAN ASSISTANT

## 2022-07-13 RX ORDER — TADALAFIL 20 MG/1
TABLET ORAL
Qty: 90 TABLET | Refills: 0 | Status: SHIPPED | OUTPATIENT
Start: 2022-07-13

## 2022-07-13 NOTE — PROGRESS NOTES
7/13/2022      Chief Complaint   Patient presents with    Benign Prostatic Hypertrophy         Assessment and Plan    78 y o  male managed by Dr Glenn Nunez     1  BPH with LUTs  · S/p GLL on 6/28/18  · AUA score: 14   · Improved from 19 at previous visit  · PVR: 15 mL  · Notes improvement on alfuzosin 10 mg po daily  · [facility]  · Follow up in 1 year  2  ED  · Paper script printed today for Cialis 20 mg po Q3 days prn  History of Present Illness  Gary Jacobson is a 78 y o  male here for evaluation of BPH symptoms on new medication  Patient is s/p GreenLight laser procedure for enlarged prostate on 06/20/2018  He was evaluated on 05/11/2022 for frequency, urgency, nocturia, and weak stream   Fused repeat cystoscopy for further evaluation  He was started on alfuzosin 10 mg p o  daily and presented today for follow-up  Patient notes overall improvement in his urinary symptoms  He states his nocturia has improved from 5 times per night to 3 times per night  He notes a stronger stream   He continues to have frequency secondary to bladder irritants and occasionally has urgency  He denies any dysuria or hematuria  He does note overall effectiveness with this Cialis  He is interested in a paper script today for more cost affordable options despite GoodRx coupon  [facility]  Review of Systems   Constitutional: Negative for chills and fever  HENT: Negative  Respiratory: Negative  Gastrointestinal: Negative for abdominal pain, nausea and vomiting  Genitourinary: Positive for frequency (continues to have secondary to bladder irritants like soda)  Negative for difficulty urinating (but does strain to fully empty at times), dysuria, flank pain, hematuria, scrotal swelling, testicular pain and urgency  Nocturia improved from 5 to 3x per night  Denies incontinence but reports occasion   Musculoskeletal: Negative  Skin: Negative  Neurological: Negative  AUA SYMPTOM SCORE    Flowsheet Row Most Recent Value   AUA SYMPTOM SCORE    How often have you had a sensation of not emptying your bladder completely after you finished urinating? 3   How often have you had to urinate again less than two hours after you finished urinating? 2   How often have you found you stopped and started again several times when you urinate? 2   How often have you found it difficult to postpone urination? 1   How often have you had a weak urinary stream? 2   How often have you had to push or strain to begin urination? 1   How many times did you most typically get up to urinate from the time you went to bed at night until the time you got up in the morning? 3   Quality of Life: If you were to spend the rest of your life with your urinary condition just the way it is now, how would you feel about that? 3   AUA SYMPTOM SCORE 14           Vitals  Vitals:    07/13/22 0814   BP: 142/80   Weight: 82 6 kg (182 lb)   Height: 5' 8" (1 727 m)       Physical Exam  Vitals reviewed  Constitutional:       General: He is not in acute distress  Appearance: Normal appearance  He is not ill-appearing, toxic-appearing or diaphoretic  HENT:      Head: Normocephalic and atraumatic  Eyes:      Conjunctiva/sclera: Conjunctivae normal    Pulmonary:      Effort: Pulmonary effort is normal  No respiratory distress  Abdominal:      General: There is no distension  Palpations: Abdomen is soft  Tenderness: There is no abdominal tenderness  There is no right CVA tenderness, left CVA tenderness, guarding or rebound  Musculoskeletal:         General: Normal range of motion  Cervical back: Normal range of motion  Skin:     General: Skin is warm and dry  Neurological:      General: No focal deficit present  Mental Status: He is alert and oriented to person, place, and time     Psychiatric:         Mood and Affect: Mood normal          Behavior: Behavior normal  Thought Content:  Thought content normal          Judgment: Judgment normal            Past History  Past Medical History:   Diagnosis Date    BPH with obstruction/lower urinary tract symptoms     TURP today 2018    Cataract     Chronic bronchitis (HCC)     Diverticulosis     Emphysema lung (HCC)     Erectile dysfunction     Feeling of incomplete bladder emptying     Full dentures     upper    Hearing aid worn     b/l ears    Hypertension     Malaria     age 21-treated with antibiotics    Nocturia     Poor urinary stream     PTSD (post-traumatic stress disorder)     Pulmonary emphysema (HCC)     Serous retinal detachment      right eye-resolved w/ surgery    Tarsal tunnel syndrome     left    Tinnitus     Wears glasses     Wears partial dentures     lower     Social History     Socioeconomic History    Marital status: /Civil Union     Spouse name: Not on file    Number of children: Not on file    Years of education: Not on file    Highest education level: Not on file   Occupational History    Not on file   Tobacco Use    Smoking status: Former Smoker     Packs/day: 2 00     Years: 38 00     Pack years: 76 00     Quit date:      Years since quittin 5    Smokeless tobacco: Never Used    Tobacco comment: quit 13 years   Substance and Sexual Activity    Alcohol use: No    Drug use: No    Sexual activity: Not on file   Other Topics Concern    Not on file   Social History Narrative    Not on file     Social Determinants of Health     Financial Resource Strain: Not on file   Food Insecurity: Not on file   Transportation Needs: Not on file   Physical Activity: Not on file   Stress: Not on file   Social Connections: Not on file   Intimate Partner Violence: Not on file   Housing Stability: Not on file     Social History     Tobacco Use   Smoking Status Former Smoker    Packs/day: 2 00    Years: 38 00    Pack years: 76 00    Quit date:     Years since quittin 5   Smokeless Tobacco Never Used   Tobacco Comment    quit 15 years     Family History   Problem Relation Age of Onset    Liver disease Mother     Other Father         bladder cancer    Alcohol abuse Family        The following portions of the patient's history were reviewed and updated as appropriate: allergies, current medications, past medical history, past social history, past surgical history and problem list     Results  Recent Results (from the past 1 hour(s))   POCT Measure PVR    Collection Time: 22  8:22 AM   Result Value Ref Range    POST-VOID RESIDUAL VOLUME, ML POC 15 mL   ]  Lab Results   Component Value Date    PSA 0 4 2018     Lab Results   Component Value Date    CALCIUM 9 2 2018    K 4 2 2018    CO2 27 2018     2018    BUN 24 2018    CREATININE 1 15 2018     Lab Results   Component Value Date    WBC 6 75 2018    HGB 14 4 2018    HCT 41 9 2018    MCV 99 (H) 2018     2018     German Gan PA-C

## 2023-07-10 ENCOUNTER — TELEPHONE (OUTPATIENT)
Dept: UROLOGY | Facility: AMBULATORY SURGERY CENTER | Age: 78
End: 2023-07-10

## 2023-07-10 DIAGNOSIS — N52.9 ERECTILE DYSFUNCTION, UNSPECIFIED ERECTILE DYSFUNCTION TYPE: ICD-10-CM

## 2023-07-10 RX ORDER — TADALAFIL 20 MG/1
TABLET ORAL
Qty: 90 TABLET | Refills: 0 | Status: SHIPPED | OUTPATIENT
Start: 2023-07-10

## 2023-07-10 NOTE — TELEPHONE ENCOUNTER
Patient is calling to request a prescription refill    Last seen by:    Medication: tadalafil (CIALIS) 20 MG tablet     Pharmacy:     [id number], [address] [place] [address], [address] [place] [address]   Phone:  [phone]  Fax:  [phone]     51 / 90 day supply: 90     Pt can be reached at: [phone]

## 2025-01-16 NOTE — PROGRESS NOTES
I have reviewed the notes, assessments, and/or procedures performed by the medical assistant, I concur with her/his documentation of Francia Bella  Universal Safety Interventions

## 2025-07-25 DIAGNOSIS — N52.9 ERECTILE DYSFUNCTION, UNSPECIFIED ERECTILE DYSFUNCTION TYPE: ICD-10-CM

## 2025-07-25 RX ORDER — TADALAFIL 20 MG/1
TABLET ORAL
Qty: 90 TABLET | Refills: 1 | OUTPATIENT
Start: 2025-07-25

## 2025-07-25 NOTE — TELEPHONE ENCOUNTER
Reason for call:   [x] Refill   [] Prior Auth  [] Other:     Office:   [] PCP/Provider -   [x] Specialty/Provider -       tadalafil (CIALIS) 20 MG tablet       Quantity: 90    Pharmacy: [facility]   Does the patient have enough for 3 days?   [] Yes   [x] No - Send as HP to POD    Mail Away Pharmacy   Does the patient have enough for 10 days?   [] Yes   [] No - Send as HP to POD

## 2025-07-25 NOTE — TELEPHONE ENCOUNTER
Patient needs to be scheduled for new patient appointment as he has not been seen since 2022.  Otherwise PCP can take over refills.

## 2025-07-28 NOTE — TELEPHONE ENCOUNTER
LVM for patient to call the office back at [phone].      When patient calls back please tell him he needs to be scheduled for new patient appointment as he has not been seen since 2022. Otherwise PCP can take over refills for his Cialis.

## (undated) DEVICE — TRANSPOSAL ULTRAFLEX DUO/QUAD ULTRA CART MANIFOLD

## (undated) DEVICE — LASER FIBER: Brand: MOXY

## (undated) DEVICE — GLOVE SRG BIOGEL 7.5

## (undated) DEVICE — TUBING SUCTION 5MM X 12 FT

## (undated) DEVICE — SCD SEQUENTIAL COMPRESSION COMFORT SLEEVE MEDIUM KNEE LENGTH: Brand: KENDALL SCD

## (undated) DEVICE — UROCATCH BAG

## (undated) DEVICE — PACK TUR

## (undated) DEVICE — INVIEW CLEAR LEGGINGS: Brand: CONVERTORS

## (undated) DEVICE — IV SET 15 DROP 3/Y GRAVITY CARESITE

## (undated) DEVICE — LUBRICANT SURGILUBE TUBE 4 OZ  FLIP TOP